# Patient Record
Sex: MALE | Race: WHITE | NOT HISPANIC OR LATINO | Employment: OTHER | URBAN - METROPOLITAN AREA
[De-identification: names, ages, dates, MRNs, and addresses within clinical notes are randomized per-mention and may not be internally consistent; named-entity substitution may affect disease eponyms.]

---

## 2021-06-09 ENCOUNTER — TRANSFERRED RECORDS (OUTPATIENT)
Dept: HEALTH INFORMATION MANAGEMENT | Facility: CLINIC | Age: 51
End: 2021-06-09

## 2021-06-09 ENCOUNTER — MEDICAL CORRESPONDENCE (OUTPATIENT)
Dept: HEALTH INFORMATION MANAGEMENT | Facility: CLINIC | Age: 51
End: 2021-06-09

## 2021-06-10 ENCOUNTER — MEDICAL CORRESPONDENCE (OUTPATIENT)
Dept: HEALTH INFORMATION MANAGEMENT | Facility: CLINIC | Age: 51
End: 2021-06-10

## 2021-06-11 ENCOUNTER — TRANSCRIBE ORDERS (OUTPATIENT)
Dept: OTHER | Age: 51
End: 2021-06-11

## 2021-06-11 DIAGNOSIS — J32.4 CHRONIC PANSINUSITIS: ICD-10-CM

## 2021-06-11 DIAGNOSIS — M31.31 GRANULOMATOSIS WITH POLYANGIITIS WITH RENAL INVOLVEMENT (H): Primary | ICD-10-CM

## 2021-06-15 NOTE — TELEPHONE ENCOUNTER
FUTURE VISIT INFORMATION      FUTURE VISIT INFORMATION:    Date: 7/14/21    Time: Noon    [place] [facility]  REFERRAL INFORMATION:    Referring provider:  Dr. Marlen Ortez    Referring providers clinic:  [facility]    Reason for visit/diagnosis: Pansinusitis, granulomatosis with polyangitis with renal involvement    RECORDS REQUESTED FROM:       Clinic name Comments Records Status Imaging Status   [facility] 6/9/21, 5/14/21 - ENT OV with Dr. Ortez Care Everywhere    [facility] - Imaging 4/28/21 - CT Sinus  6/20/18 - MRI Head Care Everywhere 6/15 Req - In PACs   [facility] 5/4/21 - RHEUM OV with Dr. Jett  3/21/21 - Admission with Dr. Obrien  7/7/20 - PULM OV with Amirah Lopez NP  5/28/20 - ENT OV with YASIR Davis Care Everywhere    [facility] - Imaging 3/22/21 - MRI Orbits  3/22/21 - MRI Brain  11/19/18 - CT IAC Temporal Bones WO Care Everywhere 6/15 Req - In PACs           * 6/15/21 8:41 AM Faxed req to [facility] and [facility] for images to be pushed to [facility]. - Gisselle  * 6/30/21 7:33 AM Faxed 2nd urg req to [facility] for images to be pushed to [facility]. - Gisselle

## 2021-07-14 ENCOUNTER — OFFICE VISIT (OUTPATIENT)
Dept: OTOLARYNGOLOGY | Facility: CLINIC | Age: 51
End: 2021-07-14
Attending: OTOLARYNGOLOGY
Payer: COMMERCIAL

## 2021-07-14 ENCOUNTER — TRANSFERRED RECORDS (OUTPATIENT)
Dept: OTOLARYNGOLOGY | Facility: CLINIC | Age: 51
End: 2021-07-14

## 2021-07-14 ENCOUNTER — PRE VISIT (OUTPATIENT)
Dept: OTOLARYNGOLOGY | Facility: CLINIC | Age: 51
End: 2021-07-14

## 2021-07-14 VITALS — WEIGHT: 190 LBS | BODY MASS INDEX: 25.18 KG/M2 | HEIGHT: 73 IN

## 2021-07-14 DIAGNOSIS — G50.1 ATYPICAL FACIAL PAIN: ICD-10-CM

## 2021-07-14 DIAGNOSIS — M31.31 GRANULOMATOSIS WITH POLYANGIITIS WITH RENAL INVOLVEMENT (H): ICD-10-CM

## 2021-07-14 DIAGNOSIS — J34.89 NASAL CRUSTING: ICD-10-CM

## 2021-07-14 DIAGNOSIS — J32.4 CHRONIC PANSINUSITIS: Primary | ICD-10-CM

## 2021-07-14 PROCEDURE — 31237 NSL/SINS NDSC SURG BX POLYPC: CPT | Mod: 50 | Performed by: OTOLARYNGOLOGY

## 2021-07-14 PROCEDURE — 99244 OFF/OP CNSLTJ NEW/EST MOD 40: CPT | Mod: 25 | Performed by: OTOLARYNGOLOGY

## 2021-07-14 RX ORDER — ACETAMINOPHEN 160 MG
TABLET,DISINTEGRATING ORAL
COMMUNITY
Start: 2021-04-22

## 2021-07-14 RX ORDER — FOLIC ACID 1 MG/1
TABLET ORAL
COMMUNITY
Start: 2021-06-29

## 2021-07-14 RX ORDER — CLARITHROMYCIN 500 MG
500 TABLET ORAL
COMMUNITY
Start: 2021-06-09 | End: 2021-07-21

## 2021-07-14 RX ORDER — CARVEDILOL 6.25 MG/1
TABLET ORAL
COMMUNITY
Start: 2021-07-03

## 2021-07-14 RX ORDER — CALCITRIOL 0.25 UG/1
0.25 CAPSULE, LIQUID FILLED ORAL
COMMUNITY
Start: 2021-04-22

## 2021-07-14 RX ORDER — AZATHIOPRINE 50 MG/1
50 TABLET ORAL
COMMUNITY
Start: 2021-01-12

## 2021-07-14 RX ORDER — FLUTICASONE PROPIONATE 50 MCG
2 SPRAY, SUSPENSION (ML) NASAL
COMMUNITY
Start: 2021-03-31 | End: 2022-03-31

## 2021-07-14 RX ORDER — PREDNISONE 10 MG/1
TABLET ORAL
COMMUNITY
Start: 2021-06-09

## 2021-07-14 RX ORDER — AZELASTINE 1 MG/ML
1-2 SPRAY, METERED NASAL
COMMUNITY
Start: 2021-01-12

## 2021-07-14 ASSESSMENT — MIFFLIN-ST. JEOR: SCORE: 1780.71

## 2021-07-14 ASSESSMENT — PAIN SCALES - GENERAL: PAINLEVEL: NO PAIN (0)

## 2021-07-14 NOTE — PROGRESS NOTES
Faxed CT order to patient's local clinic per provider request.    Requested order faxed to last facility patient had CT completed. Per patient chart last CT completed at [facility] Contacted facility for fax number to radiology and order faxed to [phone]. Patient was advised per provider to complete Biaxin before completing CT scan.    DEANNA GARY LPN on 7/14/2021 at 1:26 PM

## 2021-07-14 NOTE — PROGRESS NOTES
[facility]                   New Patient Visit      Encounter date: July 14, 2021    Referring Provider:   Marlen Ortez MD  [facility]  [address] [place] [address]    Chief Complaint: chronic sinusitis    History of Present Illness: Duglas Ricci is a 51 year-old gentleman with a history of GPA with prominent renal involvement, sinus, and moderate lung involvement. He states that he first noticed signs and symptoms 3 years ago with headaches, nasal crusting, and cough. His most bothersome symptomology today is chronic headache and nasal congestion. He is using budesonide rinses 2x daily and 6 weeks of Biaxin, with 1 week to completion. He is using 5mg Prednisone and Imuran--currently on hold secondary to active antibiotics.  Tried rituxan, but no improvement with this. No changes in external appearance of nose.      His current ENT Dr. Ortez referred him here for consultation for GPA-related CRS.   The patient is on an inactive transplant list tracking declining kidney functionality.     Sino-Nasal Outcome Test (SNOT - 22)   DNC    Review of systems: A 14-point review of systems has been conducted and was negative for any notable symptoms, except as dictated in the history of present illness.     Pmh:  GPA    PSH:  No prior sinonasal surgery    FH:  noncontributory    Social History     Socioeconomic History     Marital status: Single     Spouse name: Not on file     Number of children: Not on file     Years of education: Not on file     Highest education level: Not on file   Occupational History     Not on file   Tobacco Use     Smoking status: Not on file   Substance and Sexual Activity     Alcohol use: Not on file     Drug use: Not on file     Sexual activity: Not on file   Other Topics Concern     Not on file   Social History Narrative     Not on file     Social Determinants of Health     Financial Resource Strain:      Difficulty of Paying Living Expenses:   "  Food Insecurity:      Worried About Running Out of Food in the Last Year:      Ran Out of Food in the Last Year:    Transportation Needs:      Lack of Transportation (Medical):      Lack of Transportation (Non-Medical):    Physical Activity:      Days of Exercise per Week:      Minutes of Exercise per Session:    Stress:      Feeling of Stress :    Social Connections:      Frequency of Communication with Friends and Family:      Frequency of Social Gatherings with Friends and Family:      Attends Yarsanism Services:      Active Member of Clubs or Organizations:      Attends Club or Organization Meetings:      Marital Status:    Intimate Partner Violence:      Fear of Current or Ex-Partner:      Emotionally Abused:      Physically Abused:      Sexually Abused:         Physical Exam:  Vital signs: Ht 1.854 m (6' 1\")   Wt 86.2 kg (190 lb)   BMI 25.07 kg/m     General Appearance: No acute distress, appropriate demeanor, conversant  Eyes: moist conjunctivae; EOMI; pupils symmetric; visual acuity grossly intact; no proptosis  Head: normocephalic; overall symmetric appearance without deformity  Face: overall symmetric without deformity; HB I/VI  Ears: Normal appearance of external ear; external meatus normal in appearance; TMs intact without perforation bilaterally;   Nose: No external deformity;  See nasal endoscopy  Oral Cavity/oropharynx: Normal appearance of mucosa; tongue midline; no mass or lesions; oropharynx without obvious mucosal abnormality  Neck: no palpable lymphadenopathy; thyroid without palpable nodules  Lungs: symmetric chest rise; no wheezing  CV: Good distal perfusion; normal heart rate  Extremities: No deformity  Neurologic Exam: Cranial nerves II-XII are grossly intact; no focal deficit      Procedure Note  Procedure performed: Rigid nasal endoscopy with bilateral debridement  Indication: To evaluate for sinonasal pathology not visualized on routine anterior rhinoscopy  Anesthesia: 4% topical " lidocaine with 0.05% oxymetazoline  Description of procedure: A 30 degree, 3 mm rigid endoscope was inserted into bilateral nasal cavities and the nasal valve, nasal cavity, middle meatus, sphenoethmoid recess, and nasopharynx were thoroughly evaluated for evidence of obstruction, edema, purulence, polyps and/or mass/lesion.     I then used a combination of non-cutting forceps, straight and curved suction to clear bilateral sinonasal spaces of packing, clot, crust, and fibrinopurulent debris.     [place]-Ramakrishna Endoscopic Scoring System  Endoscopic observation Right Left   Polyps in middle meatus (0 = absent, 1 = restricted to middle meatus, 2 = Beyond middle meatus) 0 0   Discharge (0 = absent, 1 = thin and clear, 2 = thick, purulent) 1 1   Edema (0 = absent, 1 = mild-moderate, 2 = moderate-severe) 1 1   Crusting (0 = absent, 1 = mild-moderate, 2 = moderate-severe) 2 2   Scarring (0= absent, 1 = mild-moderate, 2 = moderate-severe) 1 1   Total 5 5     Findings  RT: Septal deviation. 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   LT: MMandSER with crusting and scarring.    The patient tolerated the procedure well without complication.     Laboratory Review:  n/a    Imaging Review:  Reviewed outside CT sinus from 4/28/2021 (prior to 6 week course of biaxin):  pansinus opacification    Pathology Review:  n/a    Assessment/Medical Decision Making/Plan:  GPA-related CRS  Nasal congestion  Atypical facial pain  Nasal crusting    Bilateral endo-debridement today  Discussed management of GPA-related CRS, associated challenges, and unique indications for surgery in GPA sinusitis. Specifically, we discussed that surgery would be indicated for persistent sinus disease and related symptoms despite appropriate medications, and complicated sinusitis (both of which he has had). We discussed that post-operative course can be challenged by poor wound healing and scarring in GPA    I discussed the risks, benefits, and  alternatives to endoscopic sinonasal surgery, including but not limited to: pain, bleeding, infection, CSF leak, orbital injury resulting in vision changes and/or vision loss, septal perforation and/or hematoma, dental hypesthesia, facial numbness, need for continued medical management after surgery, and need for additional procedures, among others. He was allowed to ask questions, which I answered to the best of my ability.     We also discussed an approach involving CT scan and antibiotic irrigation with trending symptom tolerance. Due to insurance reasons, patient will do the CT out patient at his own clinic; orders sent. Gentamicin irrigation initiated alongside current treatments (budesonide irrigation). After this discussion, he is amenable to surgery, but will wait at this time for outside results to be available and follow up virtually, after we've had a chance to review updated CT.       Hunter Gallego MD    [facility]  Center for Skull Base and Pituitary Surgery  [facility]  Department of Otolaryngology - Head & Neck Surgery    Justo Rodriguez, assisted with documentation. I have reviewed and revised as needed.

## 2021-07-14 NOTE — NURSING NOTE
"Chief Complaint   Patient presents with     Consult     Chronic Pansinusitis, grandulomatosis with polyangiitis with renal involvement       Height 1.854 m (6' 1\"), weight 86.2 kg (190 lb).    Dorina Gregg, EMT  "

## 2021-07-14 NOTE — PATIENT INSTRUCTIONS
1. You were seen in the clinic today by Dr. Gallego.    2.   The following has been recommended for you:   -Continue Budesonide rinses.   -Start Gentamicin rinses twice daily. Someone from [facility] pharmacy will reach out to you to obtain your insurance information and set up delivery.   -Complete your course of Biaxin. Once completed, please obtain CT scan locally based upon your last imaging. It appears your last CT scan was completed on 04/28/2021 at [facility] so we will fax the order over to them today. Please call them to schedule your CT. Once you have completed your CT scan, please give us a call so we can have them send over the images for Dr. Gallego to review and then he can follow up with you regarding ongoing treatment and care.    3.   Someone from the ENT clinic will call you to follow up with you regarding your results once your provider has had a chance to review them. We will discuss a plan of care and follow up with your provider at that time.    If you have any questions or concerns after your appointment, please call the clinic.    -Clinic phone [phone]. Press option #1 for scheduling related needs. Press option #3 for nurse advice.     June Martin, GWENDOLYNN  [phone]    Juliann Paniagua, RNCC  [phone]    [facility]  Department of Otolaryngology

## 2021-07-30 ENCOUNTER — TRANSFERRED RECORDS (OUTPATIENT)
Dept: HEALTH INFORMATION MANAGEMENT | Facility: CLINIC | Age: 51
End: 2021-07-30

## 2021-08-02 ENCOUNTER — TELEPHONE (OUTPATIENT)
Dept: OTOLARYNGOLOGY | Facility: CLINIC | Age: 51
End: 2021-08-02

## 2021-08-02 NOTE — TELEPHONE ENCOUNTER
Records Requested  08/02/21    Facility  [facility]  Phone: [phone]   Outcome * 8/2/21 10:28 AM Called out to [facility] for images to be pushed to [place]. They stated they'll push it ASAP. - Gisselle    * 8/2/21 10:46 AM Imaging received and attached to patient in PACs. - Gisselle    7/30/21 - CT Sinus WO

## 2021-08-06 ENCOUNTER — TELEPHONE (OUTPATIENT)
Dept: OTOLARYNGOLOGY | Facility: CLINIC | Age: 51
End: 2021-08-06

## 2021-08-06 NOTE — TELEPHONE ENCOUNTER
Called patient and spoke with him about scheduling a follow up video visit regarding the results of patient's CT scan. Expressed to patient that we have scheduled him for a video visit with Dr. Gallego on Wednesday, August 11th at 9:45 am.    Patient is unable to do video visit and is requesting a phone call from the provider. Verbalized with patient that he is not interested in viewing the images with the provider.    Spoke with provider about above patient request. Provider is in agreement with telephone visit.    Advised patient that someone from our clinic will call him about 15 minutes before appointment time Wednesday to get him checked in and then provider will be on shortly thereafter.    Patient advised to call us if he has any questions or concerns prior to appointment.    Patient verbalized understanding of all instructions and is in agreement with plan.    Patient visit mode switched from video visit to telephone visit per request of patient and agreement of provider.    DEANNA GARY LPN on 8/6/2021 at 2:43 PM

## 2021-08-11 ENCOUNTER — VIRTUAL VISIT (OUTPATIENT)
Dept: OTOLARYNGOLOGY | Facility: CLINIC | Age: 51
End: 2021-08-11
Payer: COMMERCIAL

## 2021-08-11 DIAGNOSIS — J32.4 CHRONIC PANSINUSITIS: ICD-10-CM

## 2021-08-11 DIAGNOSIS — R09.81 NASAL CONGESTION: Primary | ICD-10-CM

## 2021-08-11 DIAGNOSIS — M31.31 GRANULOMATOSIS WITH POLYANGIITIS WITH RENAL INVOLVEMENT (H): ICD-10-CM

## 2021-08-11 PROCEDURE — 99213 OFFICE O/P EST LOW 20 MIN: CPT | Mod: 95 | Performed by: OTOLARYNGOLOGY

## 2021-08-11 NOTE — PATIENT INSTRUCTIONS
1. You were seen in the clinic today by Dr. Gallego.    2.   The following has been recommended for you:   -Finish using the Gentamicin rinses and then resume the Budesonide rinses as per instruction of Dr. Gallego.   -Dr. Gallego did review surgery with you today and should you decide that you would like to proceed, please give us a call and we will get things moving on that. In the meantime, reach out to us should you have any symptom recurrence of sinus infections.    3.   Plan to return the clinic in 3 months for follow up.    If you have any questions or concerns after your appointment, please call the clinic.    -Clinic phone [phone]. Press option #1 for scheduling related needs. Press option #3 for nurse advice.     June Martin, CELESTINO  [phone]    Juliann Paniagua, RNCC  [phone]    [facility]  Department of Otolaryngology

## 2021-08-11 NOTE — LETTER
8/11/2021       RE: Duglas Ricci  [address]  [place] [facility] [address]     Dear Colleague,    Thank you for referring your patient, Duglas Ricci, to the [facility] [place] [facility]. Please see a copy of my visit note below.                     [facility]                     Return Patient Visit      Encounter date: August 11, 2021    Referring Provider:   Marlen Ortez MD  [facility]  [address] [place] [address]    Chief Complaint: chronic sinusitis    History of Present Illness/Initial Visit: Duglas Ricci is a 51 year-old gentleman with a history of GPA with prominent renal involvement, sinus, and moderate lung involvement. He states that he first noticed signs and symptoms 3 years ago with headaches, nasal crusting, and cough. His most bothersome symptomology today is chronic headache and nasal congestion. He is using budesonide rinses 2x daily and 6 weeks of Biaxin, with 1 week to completion. He is using 5mg Prednisone and Imuran--currently on hold secondary to active antibiotics.  Tried rituxan, but no improvement with this. No changes in external appearance of nose.      His current ENT Dr. Ortez referred him here for consultation for GPA-related CRS.   The patient is on an inactive transplant list tracking declining kidney functionality.     Interval visit:  Returns for telephone visit after obtaining updated CT, which we have reviewed below  Still c/o nasal congestion, facial pressure, somewhat improved with budesonide irrigations/gent rinses    Sino-Nasal Outcome Test (SNOT - 22)   DNC    Review of systems: A 14-point review of systems has been conducted and was negative for any notable symptoms, except as dictated in the history of present illness.     Pmh:  GPA    PSH:  No prior sinonasal surgery    FH:  noncontributory    Social History     Socioeconomic History     Marital  status: Single     Spouse name: Not on file     Number of children: Not on file     Years of education: Not on file     Highest education level: Not on file   Occupational History     Not on file   Tobacco Use     Smoking status: Not on file   Substance and Sexual Activity     Alcohol use: Not on file     Drug use: Not on file     Sexual activity: Not on file   Other Topics Concern     Not on file   Social History Narrative     Not on file     Social Determinants of Health     Financial Resource Strain:      Difficulty of Paying Living Expenses:    Food Insecurity:      Worried About Running Out of Food in the Last Year:      Ran Out of Food in the Last Year:    Transportation Needs:      Lack of Transportation (Medical):      Lack of Transportation (Non-Medical):    Physical Activity:      Days of Exercise per Week:      Minutes of Exercise per Session:    Stress:      Feeling of Stress :    Social Connections:      Frequency of Communication with Friends and Family:      Frequency of Social Gatherings with Friends and Family:      Attends Christian Services:      Active Member of Clubs or Organizations:      Attends Club or Organization Meetings:      Marital Status:    Intimate Partner Violence:      Fear of Current or Ex-Partner:      Emotionally Abused:      Physically Abused:      Sexually Abused:         Physical Exam (previous visit - 7/14/2021):  Vital signs: There were no vitals taken for this visit.   General Appearance: No acute distress, appropriate demeanor, conversant  Eyes: moist conjunctivae; EOMI; pupils symmetric; visual acuity grossly intact; no proptosis  Head: normocephalic; overall symmetric appearance without deformity  Face: overall symmetric without deformity; HB I/VI  Ears: Normal appearance of external ear; external meatus normal in appearance; TMs intact without perforation bilaterally;   Nose: No external deformity;  See nasal endoscopy  Oral Cavity/oropharynx: Normal appearance of  mucosa; tongue midline; no mass or lesions; oropharynx without obvious mucosal abnormality  Neck: no palpable lymphadenopathy; thyroid without palpable nodules  Lungs: symmetric chest rise; no wheezing  CV: Good distal perfusion; normal heart rate  Extremities: No deformity  Neurologic Exam: Cranial nerves II-XII are grossly intact; no focal deficit      Procedure Note (previous visit - 7/14/2021)  Procedure performed: Rigid nasal endoscopy with bilateral debridement  Indication: To evaluate for sinonasal pathology not visualized on routine anterior rhinoscopy  Anesthesia: 4% topical lidocaine with 0.05% oxymetazoline  Description of procedure: A 30 degree, 3 mm rigid endoscope was inserted into bilateral nasal cavities and the nasal valve, nasal cavity, middle meatus, sphenoethmoid recess, and nasopharynx were thoroughly evaluated for evidence of obstruction, edema, purulence, polyps and/or mass/lesion.     I then used a combination of non-cutting forceps, straight and curved suction to clear bilateral sinonasal spaces of packing, clot, crust, and fibrinopurulent debris.     [place]-Ramakrishna Endoscopic Scoring System  Endoscopic observation Right Left   Polyps in middle meatus (0 = absent, 1 = restricted to middle meatus, 2 = Beyond middle meatus) 0 0   Discharge (0 = absent, 1 = thin and clear, 2 = thick, purulent) 1 1   Edema (0 = absent, 1 = mild-moderate, 2 = moderate-severe) 1 1   Crusting (0 = absent, 1 = mild-moderate, 2 = moderate-severe) 2 2   Scarring (0= absent, 1 = mild-moderate, 2 = moderate-severe) 1 1   Total 5 5     Findings  RT: Septal deviation. 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   LT: MMandSER with crusting and scarring.    The patient tolerated the procedure well without complication.     Laboratory Review:  n/a    Imaging Review:  Reviewed outside CT sinus from 4/28/2021 (prior to 6 week course of biaxin):  pansinus opacification    Reviewed updated CT sinus from  7/30/2021:  Ongoing pansinus opacification with little change from previous CT scan 3 months earlier    Pathology Review:  n/a    Assessment/Medical Decision Making/Plan:  GPA-related CRS  Nasal congestion  Atypical facial pain  Nasal crusting    Discussed management of GPA-related CRS again today in the context of updated CT    We discussed risks of surgery as below, especially challenges unique to GPA, namely scarring. Primary indications for surgery would be to avoid complicated sinusitis and then relieve many of his ongoing symptoms related to his GPA-sinusitis.     I discussed the risks, benefits, and alternatives to endoscopic sinonasal surgery, including but not limited to: pain, bleeding, infection, CSF leak, orbital injury resulting in vision changes and/or vision loss, septal perforation and/or hematoma, dental hypesthesia, facial numbness, need for continued medical management after surgery, and need for additional procedures, among others. He was allowed to ask questions, which I answered to the best of my ability.    After this discussion, Duglas would like to think about his options and let me know how he wishes to proceed. He is leaning more towards observing his symptoms for now. If this is the case, I would like for him to follow up with me in 3 months.     Hunter Gallego MD    [facility]  Center for Skull Base and Pituitary Surgery  [facility]  Department of Otolaryngology - Head & Neck Surgery    Justo Rodriguez, assisted with documentation. I have reviewed and revised as needed.       Again, thank you for allowing me to participate in the care of your patient.      Sincerely,    Hunter Gallego MD

## 2021-08-11 NOTE — PROGRESS NOTES
Duglas is a 51 year old who is being evaluated via a billable telephone visit.      What phone number would you like to be contacted at? [phone]     Attending Attestation (For Attendings USE Only)...

## 2021-08-23 NOTE — PROGRESS NOTES
Telephone visit:  Start: 9:45am  Stop: 10:00am                   [facility]                   Return Patient Visit      Encounter date: August 11, 2021    Referring Provider:   Marlen Ortez MD  [facility]  [address] [place] [address]    Chief Complaint: chronic sinusitis    History of Present Illness/Initial Visit: Duglas Ricci is a 51 year-old gentleman with a history of GPA with prominent renal involvement, sinus, and moderate lung involvement. He states that he first noticed signs and symptoms 3 years ago with headaches, nasal crusting, and cough. His most bothersome symptomology today is chronic headache and nasal congestion. He is using budesonide rinses 2x daily and 6 weeks of Biaxin, with 1 week to completion. He is using 5mg Prednisone and Imuran--currently on hold secondary to active antibiotics.  Tried rituxan, but no improvement with this. No changes in external appearance of nose.      His current ENT Dr. Ortez referred him here for consultation for GPA-related CRS.   The patient is on an inactive transplant list tracking declining kidney functionality.     Interval visit:  Returns for telephone visit after obtaining updated CT, which we have reviewed below  Still c/o nasal congestion, facial pressure, somewhat improved with budesonide irrigations/gent rinses    Sino-Nasal Outcome Test (SNOT - 22)   DNC    Review of systems: A 14-point review of systems has been conducted and was negative for any notable symptoms, except as dictated in the history of present illness.     Pmh:  GPA    PSH:  No prior sinonasal surgery    FH:  noncontributory    Social History     Socioeconomic History     Marital status: Single     Spouse name: Not on file     Number of children: Not on file     Years of education: Not on file     Highest education level: Not on file   Occupational History     Not on file   Tobacco Use     Smoking status: Not on file   Substance and Sexual Activity      Alcohol use: Not on file     Drug use: Not on file     Sexual activity: Not on file   Other Topics Concern     Not on file   Social History Narrative     Not on file     Social Determinants of Health     Financial Resource Strain:      Difficulty of Paying Living Expenses:    Food Insecurity:      Worried About Running Out of Food in the Last Year:      Ran Out of Food in the Last Year:    Transportation Needs:      Lack of Transportation (Medical):      Lack of Transportation (Non-Medical):    Physical Activity:      Days of Exercise per Week:      Minutes of Exercise per Session:    Stress:      Feeling of Stress :    Social Connections:      Frequency of Communication with Friends and Family:      Frequency of Social Gatherings with Friends and Family:      Attends Anab[employer] Services:      Active Member of Clubs or Organizations:      Attends Club or Organization Meetings:      Marital Status:    Intimate Partner Violence:      Fear of Current or Ex-Partner:      Emotionally Abused:      Physically Abused:      Sexually Abused:         Physical Exam (previous visit - 7/14/2021):  Vital signs: There were no vitals taken for this visit.   General Appearance: No acute distress, appropriate demeanor, conversant  Eyes: moist conjunctivae; EOMI; pupils symmetric; visual acuity grossly intact; no proptosis  Head: normocephalic; overall symmetric appearance without deformity  Face: overall symmetric without deformity; HB I/VI  Ears: Normal appearance of external ear; external meatus normal in appearance; TMs intact without perforation bilaterally;   Nose: No external deformity;  See nasal endoscopy  Oral Cavity/oropharynx: Normal appearance of mucosa; tongue midline; no mass or lesions; oropharynx without obvious mucosal abnormality  Neck: no palpable lymphadenopathy; thyroid without palpable nodules  Lungs: symmetric chest rise; no wheezing  CV: Good distal perfusion; normal heart rate  Extremities: No  deformity  Neurologic Exam: Cranial nerves II-XII are grossly intact; no focal deficit      Procedure Note (previous visit - 7/14/2021)  Procedure performed: Rigid nasal endoscopy with bilateral debridement  Indication: To evaluate for sinonasal pathology not visualized on routine anterior rhinoscopy  Anesthesia: 4% topical lidocaine with 0.05% oxymetazoline  Description of procedure: A 30 degree, 3 mm rigid endoscope was inserted into bilateral nasal cavities and the nasal valve, nasal cavity, middle meatus, sphenoethmoid recess, and nasopharynx were thoroughly evaluated for evidence of obstruction, edema, purulence, polyps and/or mass/lesion.     I then used a combination of non-cutting forceps, straight and curved suction to clear bilateral sinonasal spaces of packing, clot, crust, and fibrinopurulent debris.     [place]-Ramakrishna Endoscopic Scoring System  Endoscopic observation Right Left   Polyps in middle meatus (0 = absent, 1 = restricted to middle meatus, 2 = Beyond middle meatus) 0 0   Discharge (0 = absent, 1 = thin and clear, 2 = thick, purulent) 1 1   Edema (0 = absent, 1 = mild-moderate, 2 = moderate-severe) 1 1   Crusting (0 = absent, 1 = mild-moderate, 2 = moderate-severe) 2 2   Scarring (0= absent, 1 = mild-moderate, 2 = moderate-severe) 1 1   Total 5 5     Findings  RT: Septal deviation. 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   LT: MMandSER with crusting and scarring.    The patient tolerated the procedure well without complication.     Laboratory Review:  n/a    Imaging Review:  Reviewed outside CT sinus from 4/28/2021 (prior to 6 week course of biaxin):  pansinus opacification    Reviewed updated CT sinus from 7/30/2021:  Ongoing pansinus opacification with little change from previous CT scan 3 months earlier    Pathology Review:  n/a    Assessment/Medical Decision Making/Plan:  GPA-related CRS  Nasal congestion  Atypical facial pain  Nasal crusting    Discussed management of GPA-related  CRS again today in the context of updated CT    We discussed risks of surgery as below, especially challenges unique to GPA, namely scarring. Primary indications for surgery would be to avoid complicated sinusitis and then relieve many of his ongoing symptoms related to his GPA-sinusitis.     I discussed the risks, benefits, and alternatives to endoscopic sinonasal surgery, including but not limited to: pain, bleeding, infection, CSF leak, orbital injury resulting in vision changes and/or vision loss, septal perforation and/or hematoma, dental hypesthesia, facial numbness, need for continued medical management after surgery, and need for additional procedures, among others. He was allowed to ask questions, which I answered to the best of my ability.    After this discussion, Duglas would like to think about his options and let me know how he wishes to proceed. He is leaning more towards observing his symptoms for now. If this is the case, I would like for him to follow up with me in 3 months.     Hunter Gallego MD    [facility]  [facility] for Skull Base and Pituitary Surgery  [facility]  Department of Otolaryngology - Head & Neck Surgery    Justo Rodriguez, assisted with documentation. I have reviewed and revised as needed.

## 2021-11-12 ENCOUNTER — OFFICE VISIT (OUTPATIENT)
Dept: OTOLARYNGOLOGY | Facility: CLINIC | Age: 51
End: 2021-11-12
Payer: COMMERCIAL

## 2021-11-12 VITALS
WEIGHT: 196 LBS | TEMPERATURE: 98.7 F | OXYGEN SATURATION: 98 % | BODY MASS INDEX: 25.98 KG/M2 | HEIGHT: 73 IN | HEART RATE: 62 BPM

## 2021-11-12 DIAGNOSIS — M31.31 GRANULOMATOSIS WITH POLYANGIITIS WITH RENAL INVOLVEMENT (H): ICD-10-CM

## 2021-11-12 DIAGNOSIS — J32.4 CHRONIC PANSINUSITIS: Primary | ICD-10-CM

## 2021-11-12 DIAGNOSIS — R09.81 NASAL CONGESTION: ICD-10-CM

## 2021-11-12 PROCEDURE — 99213 OFFICE O/P EST LOW 20 MIN: CPT | Mod: 25 | Performed by: OTOLARYNGOLOGY

## 2021-11-12 PROCEDURE — 31237 NSL/SINS NDSC SURG BX POLYPC: CPT | Mod: 50 | Performed by: OTOLARYNGOLOGY

## 2021-11-12 RX ORDER — BUDESONIDE 0.5 MG/2ML
INHALANT ORAL
COMMUNITY
Start: 2021-09-27

## 2021-11-12 ASSESSMENT — MIFFLIN-ST. JEOR: SCORE: 1807.93

## 2021-11-12 ASSESSMENT — PAIN SCALES - GENERAL: PAINLEVEL: NO PAIN (0)

## 2021-11-12 NOTE — NURSING NOTE
"Chief Complaint   Patient presents with     RECHECK     3 month follow up      Pulse 62, temperature 98.7  F (37.1  C), height 1.854 m (6' 1\"), weight 88.9 kg (196 lb), SpO2 98 %.    Renzo Oviedo LPN    "

## 2021-11-12 NOTE — PATIENT INSTRUCTIONS
"1. You were seen in the clinic today by Dr. Gallego. If you have any questions or concerns after your appointment, please call the clinic at [phone]. Press \"1\" for scheduling, press \"3\" for nurse advice.    2.   The following has been recommended for you based upon your appointment today:   -Continue twice daily sinus rinses     3.   Plan to return the clinic in 6 months       Juliann Paniagua RN   [facility]  Department of Otolaryngology  [phone]    "

## 2021-11-12 NOTE — PROGRESS NOTES
"  [facility]  Return Visit  Encounter date:   November 12, 2021    Chief Complaint: chronic sinusitis     Interval History:   Duglas Ricci is a 51 year old male, with a history of GPA with prominent renal involvement, sinus, and moderate lung involvement who presents for follow up. The patient last presented to me in 3 months ago (08/11/2021) for 3 years of headaches, nasal crusting, and cough. The patient has used budesonide rinses 2x daily and had one week left of a 6-week course of Biaxin. He held 5 mg of Prednisone and Imuran secondary to antibiotics. His current ENT Dr. Ortez referred him here for consultation for GPA-related CRS. The patient is on an inactive transplant list tracking declining kidney functionality. We discussed surgical intervention at our last  Visit, and the patient wanted time to think about this.     Today, the patient reports drainage out of the left side of the nose. He reports improved facial pressure on the left side. The patient is taking Imuran and Prednisone as directed.     [place] [facility]  No history of sinonasal surgery    Review of systems: A 14-point review of systems has been conducted and is negative for any notable symptoms, except as dictated in the history of present illness.     Physical Exam:  Vital signs: Pulse 62   Temp 98.7  F (37.1  C)   Ht 1.854 m (6' 1\")   Wt 88.9 kg (196 lb)   SpO2 98%   BMI 25.86 kg/m     General Appearance: No acute distress, appropriate demeanor, conversant  Eyes: moist conjunctivae; EOMI; pupils symmetric; visual acuity grossly intact; no proptosis  Head: normocephalic; overall symmetric appearance without deformity  Face: overall symmetric without deformity; HB I/VI  Nose: No external deformity   Neck: no palpable lymphadenopathy; thyroid without palpable nodules  Lungs: symmetric chest rise; no wheezing  CV: Good distal perfusion; normal hear rate  Extremities: No deformity  Neurologic Exam: Cranial nerves " II-XII are grossly intact; no focal deficit    Procedure Note  Procedure performed: Rigid nasal endoscopy with bilateral debridement  Indication: To evaluate for sinonasal pathology not visualized on routine anterior rhinoscopy  Anesthesia: 4% topical lidocaine with 0.05 % oxymetazoline  Description of procedure: A 30 degree, 3 mm rigid endoscope was inserted into bilateral nasal cavities and the nasal valves, nasal cavity, middle meatus, sphenoethmoid recess, nasopharynx were evaluated for evidence of obstruction, edema, purulence, polyps and/or mass/lesion.     I then used a combination of non-cutting forceps, straight and curved suction to clear bilateral surgical cavities of packing, clot, crust, and fibrinopurulent debris.     [place]-Ramakrishna Endoscopic Scoring System  Endoscopic observation Right Left   Polyps in middle meatus (0 = absent, 1 = restricted to middle meatus, 2 = Beyond middle meatus) 0 0   Discharge (0 = absent, 1 = thin and clear, 2 = thick, purulent) 0 0   Edema (0 = absent, 1 = mild-moderate, 2 = moderate-severe) 1 0   Crusting (0 = absent, 1 = mild-moderate, 2 = moderate-severe) 1 2   Scarring (0= absent, 1 = mild-moderate, 2 = moderate-severe) 2 1   Total 4 3     Findings  RT: MM and SER with scarring and crusting.   No purulence to suggest active infection  LT: MM and SER with scarring and crusting.  No purulence to suggest active infection      The patient tolerated the procedure well without complication.     Laboratory Review:  n/a    Imaging Review:  CT SINUSES wo CONTRAST:  (07/30/2021)  IMPRESSION:   1.  Overall unchanged extensive opacification of the frontal sinuses, ethmoid air cells, and left sphenoid sinus. There is slightly worsened opacification of the maxillary sinuses, now nearly completely opacified. Slight improvement in previously present frothy secretions in the right sphenoid sinus.   2.  Persistent occlusion of the bilateral frontoethmoidal recesses   and the left sphenoid  ostium. Unchanged narrowing of the right   sphenoid ostium. Slightly improved patency of the postsurgical left maxillary sinus drainage pathway. The right postsurgical maxillary sinus drainage pathway remains patent.    *I have personally reviewed these images and agree with the radiologist's impression*    Pathology Review:  n/a    Assessment/Medical Decision Making:  GPA-related CRS  Nasal congestion  Atypical facial pain  Nasal crusting    Nasal endoscopy with debridement today    Stable sinonasal disease without evidence of worsening GPA-related sinusitis  No external nasal deformity or septal perforation    I think reasonable to monitor for now; would not necessarily move forward with surgery at this time, as I think it would add little benefit    Plan:  Continue budesonide rinses 2-3x per day  RTC 6 months, or sooner prn    Hunter Gallego MD    [facility]  Rhinology, Endoscopic Skull Base Surgery  [facility]  Department of Otolaryngology - Head & Neck Surgery    Scribe Disclosure:  I, June Aung, am serving as a scribe to document services personally performed by Hunter Gallego MD at this visit, based upon the provider's statements to me. All documentation has been reviewed by the aforementioned provider prior to being entered into the official medical record.     Additional portions of the patient's history have been reviewed below.   ~~~~~~~~~~~~~~~~~~~~~~~~~~~~~~~~~~~~~~~~~~~~~~~~~~~~~~~~~~~~~~~~~~~~~~~~~~~~~~~~~~~~~~~~~~~~~~~~~~~~~~~~~~~~~~~~~~~~~~~~~~~~~~~~~~~~~~~    No past medical history on file.     No past surgical history on file.     No family history on file.     Social History     Socioeconomic History     Marital status: Single     Spouse name: None     Number of children: None     Years of education: None     Highest education level: None   Occupational History     None   Tobacco Use     Smoking status: Never Smoker     Smokeless tobacco:  Never Used   Substance and Sexual Activity     Alcohol use: Not Currently     Drug use: None     Sexual activity: None   Other Topics Concern     None   Social History Narrative     None     Social Determinants of Health     Financial Resource Strain: Not on file   Food Insecurity: Not on file   Transportation Needs: Not on file   Physical Activity: Not on file   Stress: Not on file   Social Connections: Not on file   Intimate Partner Violence: Not on file   Housing Stability: Not on file

## 2021-11-12 NOTE — LETTER
"11/12/2021       RE: Duglas Ricci  [address]  [place] [address]     Dear Colleague,    Thank you for referring your patient, Duglas Ricci, to the [facility] [place] [facility]. Please see a copy of my visit note below.      [facility]  Return Visit  Encounter date:   November 12, 2021    Chief Complaint: chronic sinusitis     Interval History:   Duglas Ricci is a 51 year old male, with a history of GPA with prominent renal involvement, sinus, and moderate lung involvement who presents for follow up. The patient last presented to me in 3 months ago (08/11/2021) for 3 years of headaches, nasal crusting, and cough. The patient has used budesonide rinses 2x daily and had one week left of a 6-week course of Biaxin. He held 5 mg of Prednisone and Imuran secondary to antibiotics. His current ENT Dr. Ortez referred him here for consultation for GPA-related CRS. The patient is on an inactive transplant list tracking declining kidney functionality. We discussed surgical intervention at our last  Visit, and the patient wanted time to think about this.     Today, the patient reports drainage out of the left side of the nose. He reports improved facial pressure on the left side. The patient is taking Imuran and Prednisone as directed.     [place] Oper[facility]  No history of sinonasal surgery    Review of systems: A 14-point review of systems has been conducted and is negative for any notable symptoms, except as dictated in the history of present illness.     Physical Exam:  Vital signs: Pulse 62   Temp 98.7  F (37.1  C)   Ht 1.854 m (6' 1\")   Wt 88.9 kg (196 lb)   SpO2 98%   BMI 25.86 kg/m     General Appearance: No acute distress, appropriate demeanor, conversant  Eyes: moist conjunctivae; EOMI; pupils symmetric; visual acuity grossly intact; no proptosis  Head: normocephalic; overall symmetric " appearance without deformity  Face: overall symmetric without deformity; HB I/VI  Nose: No external deformity   Neck: no palpable lymphadenopathy; thyroid without palpable nodules  Lungs: symmetric chest rise; no wheezing  CV: Good distal perfusion; normal hear rate  Extremities: No deformity  Neurologic Exam: Cranial nerves II-XII are grossly intact; no focal deficit    Procedure Note  Procedure performed: Rigid nasal endoscopy with bilateral debridement  Indication: To evaluate for sinonasal pathology not visualized on routine anterior rhinoscopy  Anesthesia: 4% topical lidocaine with 0.05 % oxymetazoline  Description of procedure: A 30 degree, 3 mm rigid endoscope was inserted into bilateral nasal cavities and the nasal valves, nasal cavity, middle meatus, sphenoethmoid recess, nasopharynx were evaluated for evidence of obstruction, edema, purulence, polyps and/or mass/lesion.     I then used a combination of non-cutting forceps, straight and curved suction to clear bilateral surgical cavities of packing, clot, crust, and fibrinopurulent debris.     Julia-Ramakrishna Endoscopic Scoring System  Endoscopic observation Right Left   Polyps in middle meatus (0 = absent, 1 = restricted to middle meatus, 2 = Beyond middle meatus) 0 0   Discharge (0 = absent, 1 = thin and clear, 2 = thick, purulent) 0 0   Edema (0 = absent, 1 = mild-moderate, 2 = moderate-severe) 1 0   Crusting (0 = absent, 1 = mild-moderate, 2 = moderate-severe) 1 2   Scarring (0= absent, 1 = mild-moderate, 2 = moderate-severe) 2 1   Total 4 3     Findings  RT: MM and SER with scarring and crusting.   No purulence to suggest active infection  LT: MM and SER with scarring and crusting.  No purulence to suggest active infection      The patient tolerated the procedure well without complication.     Laboratory Review:  n/a    Imaging Review:  CT SINUSES wo CONTRAST:  (07/30/2021)  IMPRESSION:   1.  Overall unchanged extensive opacification of the frontal  sinuses, ethmoid air cells, and left sphenoid sinus. There is slightly worsened opacification of the maxillary sinuses, now nearly completely opacified. Slight improvement in previously present frothy secretions in the right sphenoid sinus.   2.  Persistent occlusion of the bilateral frontoethmoidal recesses   and the left sphenoid ostium. Unchanged narrowing of the right   sphenoid ostium. Slightly improved patency of the postsurgical left maxillary sinus drainage pathway. The right postsurgical maxillary sinus drainage pathway remains patent.    *I have personally reviewed these images and agree with the radiologist's impression*    Pathology Review:  n/a    Assessment/Medical Decision Making:  GPA-related CRS  Nasal congestion  Atypical facial pain  Nasal crusting    Nasal endoscopy with debridement today    Stable sinonasal disease without evidence of worsening GPA-related sinusitis  No external nasal deformity or septal perforation    I think reasonable to monitor for now; would not necessarily move forward with surgery at this time, as I think it would add little benefit    Plan:  Continue budesonide rinses 2-3x per day  RTC 6 months, or sooner prn    Hunter Gallego MD    [facility]  Rhinology, Endoscopic Skull Base Surgery  [facility]  Department of Otolaryngology - Head & Neck Surgery    Scribe Disclosure:  I, June Horan, am serving as a scribe to document services personally performed by Hunter Gallego MD at this visit, based upon the provider's statements to me. All documentation has been reviewed by the aforementioned provider prior to being entered into the official medical record.     Additional portions of the patient's history have been reviewed below.   ~~~~~~~~~~~~~~~~~~~~~~~~~~~~~~~~~~~~~~~~~~~~~~~~~~~~~~~~~~~~~~~~~~~~~~~~~~~~~~~~~~~~~~~~~~~~~~~~~~~~~~~~~~~~~~~~~~~~~~~~~~~~~~~~~~~~~~~    No past medical history on file.     No past surgical  history on file.     No family history on file.     Social History     Socioeconomic History     Marital status: Single     Spouse name: None     Number of children: None     Years of education: None     Highest education level: None   Occupational History     None   Tobacco Use     Smoking status: Never Smoker     Smokeless tobacco: Never Used   Substance and Sexual Activity     Alcohol use: Not Currently     Drug use: None     Sexual activity: None   Other Topics Concern     None   Social History Narrative     None     Social Determinants of Health     Financial Resource Strain: Not on file   Food Insecurity: Not on file   Transportation Needs: Not on file   Physical Activity: Not on file   Stress: Not on file   Social Connections: Not on file   Intimate Partner Violence: Not on file   Housing Stability: Not on file        Again, thank you for allowing me to participate in the care of your patient.      Sincerely,    Hunter Gallego MD

## 2022-03-25 ENCOUNTER — TELEPHONE (OUTPATIENT)
Dept: OTOLARYNGOLOGY | Facility: CLINIC | Age: 52
End: 2022-03-25
Payer: COMMERCIAL

## 2022-07-22 ENCOUNTER — OFFICE VISIT (OUTPATIENT)
Dept: OTOLARYNGOLOGY | Facility: CLINIC | Age: 52
End: 2022-07-22
Payer: COMMERCIAL

## 2022-07-22 VITALS
OXYGEN SATURATION: 98 % | DIASTOLIC BLOOD PRESSURE: 94 MMHG | HEIGHT: 74 IN | BODY MASS INDEX: 25.28 KG/M2 | SYSTOLIC BLOOD PRESSURE: 135 MMHG | HEART RATE: 64 BPM | WEIGHT: 197 LBS | TEMPERATURE: 97.4 F

## 2022-07-22 DIAGNOSIS — J38.6 SUBGLOTTIC STENOSIS: ICD-10-CM

## 2022-07-22 DIAGNOSIS — J34.89 NASAL CRUSTING: ICD-10-CM

## 2022-07-22 DIAGNOSIS — M31.31 GRANULOMATOSIS WITH POLYANGIITIS WITH RENAL INVOLVEMENT (H): ICD-10-CM

## 2022-07-22 DIAGNOSIS — J32.4 CHRONIC PANSINUSITIS: Primary | ICD-10-CM

## 2022-07-22 PROCEDURE — 99213 OFFICE O/P EST LOW 20 MIN: CPT | Mod: 25 | Performed by: OTOLARYNGOLOGY

## 2022-07-22 PROCEDURE — 31575 DIAGNOSTIC LARYNGOSCOPY: CPT | Mod: 51 | Performed by: OTOLARYNGOLOGY

## 2022-07-22 PROCEDURE — 31231 NASAL ENDOSCOPY DX: CPT | Performed by: OTOLARYNGOLOGY

## 2022-07-22 RX ORDER — PREDNISONE 5 MG/1
5 TABLET ORAL DAILY
COMMUNITY
Start: 2021-11-01

## 2022-07-22 ASSESSMENT — PAIN SCALES - GENERAL: PAINLEVEL: NO PAIN (0)

## 2022-07-22 NOTE — PATIENT INSTRUCTIONS
"You were seen in the clinic today by Dr. Gallego. If you have any questions or concerns after your appointment, please call the clinic at [phone]. Press \"1\" for scheduling, press \"3\" for nurse advice.    2.   The following has been recommended for you based upon your appointment today:   -A referral has been sent to our laryngology team to see them for your Wegener's.    3.   Plan to return the clinic in 6 months.       June Martin LPN  [facility]  Department of Otolaryngology  [phone]   "

## 2022-07-22 NOTE — LETTER
7/22/2022       RE: Duglas Ricci  [address]  [place] [address]     Dear Colleague,    Thank you for referring your patient, Duglas Ricci, to the [facility] EAR NOSE AND THROAT CLINIC [place] at [facility]. Please see a copy of my visit note below.      [facility]  Return Visit  Encounter date:   July 22, 2022    Chief Complaint:   Follow-up     ID: chronic sinusitis    Interval History:   Duglas Ricci is a 52 year old male with a history of GPA with prominent renal involvement, sinus, and moderate lung involvement who presents for follow up. At our last visit (11/12/21) his sinonasal disease appeared stable with no evidence of worsening GPA-related sinusitis. He elected for surveillance and was advised to continue with budesonide rinses. He tells me he still has some crusting on the left side but not as severe as before and his nose is overall doing well. He has continued using budesonide rinses but doesn't notice any significant help from this. He has significant crusts produced every couple weeks out of his left nostril with the rinses. His sense of smell is slightly coming back. He reports no external changes to his nose. He has increased tearing of his eyes bilaterally but only notices occassionally or while watching TV. He denies any facial pressure or facial pain. His kidneys have been relatively stable and he is not currently on the active transplant list. He has continued 5 mg of Prednisone and Imuran with no infusions. He has noticed a more hoarse voice and shortness of breath within a year following onset of his GPA.     His rheumatologist was somewhat concerned given the recent and change in voice and some dyspnea as well.  He asked to have have laryngoscopy be performed at this visit.    Sino-Nasal Outcome Test (SNOT - 22)  [facility] Operative History  No history of sinonasal surgery    Review of systems: A  "14-point review of systems has been conducted and is negative for any notable symptoms, except as dictated in the history of present illness.     Physical Exam:  Vital signs: BP (!) 135/94 (BP Location: Left arm, Patient Position: Sitting, Cuff Size: Adult Regular)   Pulse 64   Temp 97.4  F (36.3  C) (Temporal)   Ht 1.867 m (6' 1.5\")   Wt 89.4 kg (197 lb)   SpO2 98%   BMI 25.64 kg/m     General Appearance: No acute distress, appropriate demeanor, conversant  Eyes: moist conjunctivae; EOMI; pupils symmetric; visual acuity grossly intact; no proptosis  Head: normocephalic; overall symmetric appearance without deformity  Face: overall symmetric without deformity; HB I/VI  Nose: No external nasal deformity; see endoscopy  Lungs: symmetric chest rise; no wheezing  CV: Good distal perfusion; normal hear rate  Extremities: No deformity  Neurologic Exam: Cranial nerves II-XII are grossly intact; no focal deficit    Procedure Note  Procedure performed: Flexible laryngoscopy   Indication: GPA with shortness of breath  Description: flexible laryngoscope through left nasal cavity into oropharynx  Oropharynx, larynx, and hypolarynx are examined.     There is symmetric bilateral fold movement.     There is circumferential narrowing of the subglottic larynx    Procedure performed: Rigid nasal endoscopy  Indication: To evaluate for sinonasal pathology not visualized on routine anterior rhinoscopy  Anesthesia: 4% topical lidocaine with 0.05 % oxymetazoline  Description of procedure: A 30 degree, 3 mm rigid endoscope was inserted into bilateral nasal cavities and the nasal valves, nasal cavity, middle meatus, sphenoethmoid recess, nasopharynx were evaluated for evidence of obstruction, edema, purulence, polyps and/or mass/lesion.     I then used a combination of non-cutting forceps, straight and curved suction to clear the bilateral sinonasal cavities of exuberant crust formation.    [place]-Ramakrishna Endoscopic Scoring " System  Endoscopic observation Right Left   Polyps in middle meatus (0 = absent, 1 = restricted to middle meatus, 2 = Beyond middle meatus) 0 0   Discharge (0 = absent, 1 = thin and clear, 2 = thick, purulent) 0 2   Edema (0 = absent, 1 = mild-moderate, 2 = moderate-severe) 0 0   Crusting (0 = absent, 1 = mild-moderate, 2 = moderate-severe) 1 2   Scarring (0= absent, 1 = mild-moderate, 2 = moderate-severe) 1 2   Total 2 6     Findings  RT: Mild crust of high middle meatus otherwise ; , MM and SER are clear  LT: Scarring and crusting of MM; there is some mucopurulence from the middle meatus     no septal perforation    The patient tolerated the procedure well without complication.     Laboratory Review:  N/A      Imaging Review:  CT SINUSES wo CONTRAST (07/30/2021):   1.  Overall unchanged extensive opacification of the frontal sinuses, ethmoid air cells, and left sphenoid sinus. There is slightly worsened opacification of the maxillary sinuses, now nearly completely opacified. Slight improvement in previously present frothy secretions in the right sphenoid sinus.   2.  Persistent occlusion of the bilateral frontoethmoidal recesses   and the left sphenoid ostium. Unchanged narrowing of the right   sphenoid ostium. Slightly improved patency of the postsurgical left maxillary sinus drainage pathway. The right postsurgical maxillary sinus drainage pathway remains patent.    Pathology Review:  N/A      Assessment/Medical Decision Making:  GPA-related CRS  Nasal congestion  Atypical facial pain  Nasal crusting     Bilateral endoscopy today showed crusting of the left MM which was cleared but otherwise stable compared to previous.     Laryngoscopy was performed and indicated given voice changes, cough and worsening shortness of breath.  This demonstrated some circumferential scarring of the subglottic larynx.  We will make more urgent referral to laryngology for evaluation.  We did discuss warning signs including worsening  shortness of breath in the coming weeks which may warrant sooner follow-up.    Plan:  Flex laryngoscope revealed circumferential narrowing and scarring - will provide him a referral for laryngology and will discuss case with them  RTC in 1 year for observation of sinonasal disease -I did let him know about warning sinonasal signs for which she should reach out to us sooner      Hunter Gallego MD    [facility]  Rhinology, Endoscopic Skull Base Surgery  [facility]  Department of Otolaryngology - Head & Neck Surgery    Scribe Disclosure:  I, Dottie Jadyn, am serving as a scribe to document services personally performed by Hunter Gallego MD at this visit, based upon the provider's statements to me. All documentation has been reviewed by the aforementioned provider prior to being entered into the official medical record.     Additional portions of the patient's history have been reviewed below.   ~~~~~~~~~~~~~~~~~~~~~~~~~~~~~~~~~~~~~~~~~~~~~~~~~~~~~~~~~~~~~~~~~~~~~~~~~~~~~~~~~~~~~~~~~~~~~~~~~~~~~~~~~~~~~~~~~~~~~~~~~~~~~~~~~~~~~~~    No past medical history on file.     No past surgical history on file.     No family history on file.     Social History     Socioeconomic History     Marital status: Single     Spouse name: None     Number of children: None     Years of education: None     Highest education level: None   Tobacco Use     Smoking status: Never Smoker     Smokeless tobacco: Never Used   Substance and Sexual Activity     Alcohol use: Not Currently            Again, thank you for allowing me to participate in the care of your patient.      Sincerely,    Hunter Gallego MD

## 2022-07-22 NOTE — NURSING NOTE
"Blood pressure (!) 135/94, pulse 64, temperature 97.4  F (36.3  C), temperature source Temporal, height 1.867 m (6' 1.5\"), weight 89.4 kg (197 lb), SpO2 98 %.    Treasure Tejada LPN    "

## 2022-07-22 NOTE — PROGRESS NOTES
[facility]  Return Visit  Encounter date:   July 22, 2022    Chief Complaint:   Follow-up     ID: chronic sinusitis    Interval History:   Duglas Ricci is a 52 year old male with a history of GPA with prominent renal involvement, sinus, and moderate lung involvement who presents for follow up. At our last visit (11/12/21) his sinonasal disease appeared stable with no evidence of worsening GPA-related sinusitis. He elected for surveillance and was advised to continue with budesonide rinses. He tells me he still has some crusting on the left side but not as severe as before and his nose is overall doing well. He has continued using budesonide rinses but doesn't notice any significant help from this. He has significant crusts produced every couple weeks out of his left nostril with the rinses. His sense of smell is slightly coming back. He reports no external changes to his nose. He has increased tearing of his eyes bilaterally but only notices occassionally or while watching TV. He denies any facial pressure or facial pain. His kidneys have been relatively stable and he is not currently on the active transplant list. He has continued 5 mg of Prednisone and Imuran with no infusions. He has noticed a more hoarse voice and shortness of breath within a year following onset of his GPA.     His rheumatologist was somewhat concerned given the recent and change in voice and some dyspnea as well.  He asked to have have laryngoscopy be performed at this visit.    Sino-Nasal Outcome Test (SNOT - 22)  [facility]    [place] Operative [facility]  No history of sinonasal surgery    Review of systems: A 14-point review of systems has been conducted and is negative for any notable symptoms, except as dictated in the history of present illness.     Physical Exam:  Vital signs: BP (!) 135/94 (BP Location: Left arm, Patient Position: Sitting, Cuff Size: Adult Regular)   Pulse 64   Temp 97.4  F (36.3  C) (Temporal)   Ht  "1.867 m (6' 1.5\")   Wt 89.4 kg (197 lb)   SpO2 98%   BMI 25.64 kg/m     General Appearance: No acute distress, appropriate demeanor, conversant  Eyes: moist conjunctivae; EOMI; pupils symmetric; visual acuity grossly intact; no proptosis  Head: normocephalic; overall symmetric appearance without deformity  Face: overall symmetric without deformity; HB I/VI  Nose: No external nasal deformity; see endoscopy  Lungs: symmetric chest rise; no wheezing  CV: Good distal perfusion; normal hear rate  Extremities: No deformity  Neurologic Exam: Cranial nerves II-XII are grossly intact; no focal deficit    Procedure Note  Procedure performed: Flexible laryngoscopy   Indication: GPA with shortness of breath  Description: flexible laryngoscope through left nasal cavity into oropharynx  Oropharynx, larynx, and hypolarynx are examined.     There is symmetric bilateral fold movement.     There is circumferential narrowing of the subglottic larynx    Procedure performed: Rigid nasal endoscopy  Indication: To evaluate for sinonasal pathology not visualized on routine anterior rhinoscopy  Anesthesia: 4% topical lidocaine with 0.05 % oxymetazoline  Description of procedure: A 30 degree, 3 mm rigid endoscope was inserted into bilateral nasal cavities and the nasal valves, nasal cavity, middle meatus, sphenoethmoid recess, nasopharynx were evaluated for evidence of obstruction, edema, purulence, polyps and/or mass/lesion.     I then used a combination of non-cutting forceps, straight and curved suction to clear the bilateral sinonasal cavities of exuberant crust formation.    [place]ia-Ramakrishna Endoscopic Scoring System  Endoscopic observation Right Left   Polyps in middle meatus (0 = absent, 1 = restricted to middle meatus, 2 = Beyond middle meatus) 0 0   Discharge (0 = absent, 1 = thin and clear, 2 = thick, purulent) 0 2   Edema (0 = absent, 1 = mild-moderate, 2 = moderate-severe) 0 0   Crusting (0 = absent, 1 = mild-moderate, 2 = " moderate-severe) 1 2   Scarring (0= absent, 1 = mild-moderate, 2 = moderate-severe) 1 2   Total 2 6     Findings  RT: Mild crust of high middle meatus otherwise ; , MM and SER are clear  LT: Scarring and crusting of MM; there is some mucopurulence from the middle meatus     no septal perforation    The patient tolerated the procedure well without complication.     Laboratory Review:  N/A      Imaging Review:  CT SINUSES wo CONTRAST (07/30/2021):   1.  Overall unchanged extensive opacification of the frontal sinuses, ethmoid air cells, and left sphenoid sinus. There is slightly worsened opacification of the maxillary sinuses, now nearly completely opacified. Slight improvement in previously present frothy secretions in the right sphenoid sinus.   2.  Persistent occlusion of the bilateral frontoethmoidal recesses   and the left sphenoid ostium. Unchanged narrowing of the right   sphenoid ostium. Slightly improved patency of the postsurgical left maxillary sinus drainage pathway. The right postsurgical maxillary sinus drainage pathway remains patent.    Pathology Review:  N/A      Assessment/Medical Decision Making:  GPA-related CRS  Nasal congestion  Atypical facial pain  Nasal crusting     Bilateral endoscopy today showed crusting of the left MM which was cleared but otherwise stable compared to previous.     Laryngoscopy was performed and indicated given voice changes, cough and worsening shortness of breath.  This demonstrated some circumferential scarring of the subglottic larynx.  We will make more urgent referral to laryngology for evaluation.  We did discuss warning signs including worsening shortness of breath in the coming weeks which may warrant sooner follow-up.    Plan:  Flex laryngoscope revealed circumferential narrowing and scarring - will provide him a referral for laryngology and will discuss case with them  RTC in 1 year for observation of sinonasal disease -I did let him know about warning sinonasal  signs for which she should reach out to us sooner      Hunter Gallego MD    [facility]  Rhinology, Endoscopic Skull Base Surgery  [facility]  Department of Otolaryngology - Head & Neck Surgery    Scribe Disclosure:  I, Dottie Salamanca, am serving as a scribe to document services personally performed by Hunter Gallego MD at this visit, based upon the provider's statements to me. All documentation has been reviewed by the aforementioned provider prior to being entered into the official medical record.     Additional portions of the patient's history have been reviewed below.   ~~~~~~~~~~~~~~~~~~~~~~~~~~~~~~~~~~~~~~~~~~~~~~~~~~~~~~~~~~~~~~~~~~~~~~~~~~~~~~~~~~~~~~~~~~~~~~~~~~~~~~~~~~~~~~~~~~~~~~~~~~~~~~~~~~~~~~~    No past medical history on file.     No past surgical history on file.     No family history on file.     Social History     Socioeconomic History     Marital status: Single     Spouse name: None     Number of children: None     Years of education: None     Highest education level: None   Tobacco Use     Smoking status: Never Smoker     Smokeless tobacco: Never Used   Substance and Sexual Activity     Alcohol use: Not Currently

## 2022-07-29 ENCOUNTER — DOCUMENTATION ONLY (OUTPATIENT)
Dept: OTOLARYNGOLOGY | Facility: CLINIC | Age: 52
End: 2022-07-29

## 2022-07-29 NOTE — PROGRESS NOTES
MARÍA 7/22        esperanza Lopez 7/15/22  IMPRESSION/PLAN:   1. GPA with renal involvement  Followed with rheumatology and nephrology. Currently on prednisone 5 mg daily, Imuran 50 mg BID, and folic acid 3 mg daily.  2. Obstructive lung disease  Educated the patient on the importance of compliance with maintenance inhalers. The patient is open to restarting Breo Ellipta 200/25 inhaler 1 puff 1 time daily. He will contact the pulmonary clinic in 1 month for an update of symptoms.  3. Chronic rhinitis  Encouraged the patient to start Allegra/Zyrtec/Claritin daily. He may also restart Flonase nasal spray daily.  4. RLL lung lesion  Will obtain CT chest [facility] in May 2023 for further evaluation of lung nodules, including the RLL lung lesion.

## 2022-08-01 ENCOUNTER — TELEPHONE (OUTPATIENT)
Dept: OTOLARYNGOLOGY | Facility: CLINIC | Age: 52
End: 2022-08-01

## 2022-08-01 NOTE — TELEPHONE ENCOUNTER
Left vm message for patient in regards to scheduling a sooner appointment. Offered pt 8/2 at 9 am with Cabrera. Writers call back number provided on vm, but ok to schedule as stated if pt calls call center.

## 2022-08-01 NOTE — TELEPHONE ENCOUNTER
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 [phone]  )   6/28/22 CT NECK   11/1/21 CT CHEST     [place] Karsten Jeter (ph. [phone]  )  5/9/22 CT Chest      Care everywhere [facility] ENT 6/9/21 note from Marlen Ortez DO Care everywhere     [facility] Nose [facility] Throat   5/28/2020 note from Vivian Hendricks PA   Care everywhere     [facility] procedures  8/3/2020 PFT  Scanned in care everywhere

## 2022-08-02 NOTE — TELEPHONE ENCOUNTER
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 [phone]  )   6/28/22 CT NECK   11/1/21 CT CHEST      [facility] Karsten Jeter (ph. [phone]  )  5/9/22 CT Chest        Care everywhere [facility] 6/9/21 note from Marlen Ortez DO Care everywhere      [facility] Ear[facility] Nose [facility] Throat   5/28/2020 note from Vivian Hendricks PA   Care everywhere      [facility] procedures  8/3/2020 PFT  Scanned in care everywhere                             8/2/22 3:54PM called [facility] for images, left a message to see if they can push image to [facility] PACS - [facility]  8/3/22 10AM images received in PACS - [facility]

## 2022-08-15 ENCOUNTER — TELEPHONE (OUTPATIENT)
Dept: OTOLARYNGOLOGY | Facility: CLINIC | Age: 52
End: 2022-08-15

## 2022-08-15 NOTE — TELEPHONE ENCOUNTER
Called patient to request reschedule of appointment from 8/18/22 at 1500 to 8/17/22 at 1500.  Patient agreed to schedule change and expressed no concerns during the call.  Treasure Tejada LPN

## 2022-08-17 ENCOUNTER — DOCUMENTATION ONLY (OUTPATIENT)
Dept: OTOLARYNGOLOGY | Facility: CLINIC | Age: 52
End: 2022-08-17

## 2022-08-17 ENCOUNTER — PREP FOR PROCEDURE (OUTPATIENT)
Dept: OTOLARYNGOLOGY | Facility: CLINIC | Age: 52
End: 2022-08-17

## 2022-08-17 ENCOUNTER — OFFICE VISIT (OUTPATIENT)
Dept: OTOLARYNGOLOGY | Facility: CLINIC | Age: 52
End: 2022-08-17
Payer: COMMERCIAL

## 2022-08-17 VITALS
HEIGHT: 74 IN | DIASTOLIC BLOOD PRESSURE: 102 MMHG | WEIGHT: 196.2 LBS | HEART RATE: 64 BPM | OXYGEN SATURATION: 99 % | SYSTOLIC BLOOD PRESSURE: 141 MMHG | BODY MASS INDEX: 25.18 KG/M2

## 2022-08-17 DIAGNOSIS — J38.6 SUBGLOTTIC STENOSIS: Primary | ICD-10-CM

## 2022-08-17 DIAGNOSIS — J38.6 SUBGLOTTIC STENOSIS: ICD-10-CM

## 2022-08-17 DIAGNOSIS — J32.4 CHRONIC PANSINUSITIS: Primary | ICD-10-CM

## 2022-08-17 PROCEDURE — 31575 DIAGNOSTIC LARYNGOSCOPY: CPT | Performed by: OTOLARYNGOLOGY

## 2022-08-17 PROCEDURE — 99215 OFFICE O/P EST HI 40 MIN: CPT | Mod: 25 | Performed by: OTOLARYNGOLOGY

## 2022-08-17 RX ORDER — CEFAZOLIN SODIUM 2 G/50ML
2 SOLUTION INTRAVENOUS
Status: CANCELLED | OUTPATIENT
Start: 2022-08-17

## 2022-08-17 RX ORDER — CEFAZOLIN SODIUM 2 G/50ML
2 SOLUTION INTRAVENOUS SEE ADMIN INSTRUCTIONS
Status: CANCELLED | OUTPATIENT
Start: 2022-08-17

## 2022-08-17 ASSESSMENT — PAIN SCALES - GENERAL: PAINLEVEL: NO PAIN (0)

## 2022-08-17 NOTE — PROGRESS NOTES
Order for hemoglobin A1C was faxed to [facility] lab. Fax number [phone]. 3 pgs faxed.

## 2022-08-17 NOTE — PROGRESS NOTES
[facility]   at the [facility]   Otolaryngology Clinic     Patient: Duglas Ricci    MRN: [id number]    : 1970    Age/Gender: 52 year old male  Date of Service: 2022  Rendering Provider:   Omaira Mercedes MD     Referring Provider   PCP: Edwar Calles  Referring Physician: Hunter Gallego MD  [address] [place] [address]  Reason for Consultation   GPA  Subglottic stenosis    History   HISTORY OF PRESENT ILLNESS: Mr. Ricci is a 52 year old male who presents to us today with GPA.     He presents today for evaluation. He reports:    Dysphonia  - every few days, voice sounds like he has a cold  - then after few hours, it resolves  - denies trouble breathing associated with this    Dysphagia  - denies    Dyspnea  - had difficulty breathing for 5 years  - GPA started in lungs 7 years ago  - breathing is fine when sitting  - but after walking a bit, it becomes labored  - wheezing, then he coughs trying to catch breath  - hasn't improved with treatment  - denies mucus that blocks breathing  - uses Neti pot x1 daily    Throat clearing/cough  - reports with difficulty breathing    GERD/LPRD   - denies indigestion  Denies back surgery    PAST MEDICAL HISTORY: No past medical history on file.   GPA    PAST SURGICAL HISTORY: No past surgical history on file.    CURRENT MEDICATIONS:   Current Outpatient Medications:      azaTHIOprine (IMURAN) 50 MG tablet, Take 50 mg by mouth , Disp: , Rfl:      azelastine (ASTELIN) 0.1 % nasal spray, Spray 1-2 sprays in nostril, Disp: , Rfl:      budesonide (PULMICORT) 0.5 MG/2ML neb solution, , Disp: , Rfl:      calcitRIOL (ROCALTROL) 0.25 MCG capsule, Take 0.25 mcg by mouth, Disp: , Rfl:      carvedilol (COREG) 6.25 MG tablet, TAKE 1 TABLET BY MOUTH TWICE DAILY WITH MEALS, Disp: , Rfl:      Cholecalciferol (VITAMIN D3) 50 MCG (2000) CAPS, TAKE 1 CAPSULE (50 MCG) BY MOUTH 1 TIME PER DAY, Disp: , Rfl:      folic acid (FOLVITE) 1 MG  tablet, , Disp: , Rfl:      predniSONE (DELTASONE) 5 MG tablet, Take 5 mg by mouth daily, Disp: , Rfl:      gentamicin 80 mg in 1000 ml 0.9% NaCl (GARAMYCIN) SOLN nasal irrigation, Rinse each nasal cavity with 120 ml of mixture twice daily. (Patient not taking: No sig reported), Disp: 1000 mL, Rfl: 0     predniSONE (DELTASONE) 10 MG tablet, Take 4 tabs (40mg)  qd for 5 days, then take 2 tabs (20mg)  qd for 5 days, then take 1 tab (10mg) qd for 5 days. (Patient not taking: No sig reported), Disp: , Rfl:     ALLERGIES: Other environmental allergy, Amlodipine, and Edda-kit bee sting    SOCIAL HISTORY:    Social History     Socioeconomic History     Marital status: Single     Spouse name: Not on file     Number of children: Not on file     Years of education: Not on file     Highest education level: Not on file   Occupational History     Not on file   Tobacco Use     Smoking status: Never Smoker     Smokeless tobacco: Never Used   Substance and Sexual Activity     Alcohol use: Not Currently     Drug use: Not on file     Sexual activity: Not on file   Other Topics Concern     Not on file   Social History Narrative     Not on file     Social Determinants of Health     Financial Resource Strain: Not on file   Food Insecurity: Not on file   Transportation Needs: Not on file   Physical Activity: Not on file   Stress: Not on file   Social Connections: Not on file   Intimate Partner Violence: Not on file   Housing Stability: Not on file         FAMILY HISTORY: No family history on file.  Non-contributory for problems with anesthesia    REVIEW OF SYSTEMS:   The patient was asked a 14 point review of systems regarding constitutional symptoms, eye symptoms, ears, nose, mouth, throat symptoms, cardiovascular symptoms, respiratory symptoms, gastrointestinal symptoms, genitourinary symptoms, musculoskeletal symptoms, integumentary symptoms, neurological symptoms, psychiatric symptoms, endocrine symptoms, hematologic/lymphatic  symptoms, and allergic/ immunologic symptoms.   The pertinent factors have been included in the HPI and below.  Patient Supplied Answers to Review of Systems   ENT ROS 11/12/2021   Neurology: Headache   Ears, Nose, Throat: Nasal congestion or drainage, Hoarseness   Cardiopulmonary: Cough       Physical Examination   The patient underwent a physical examination as described below. The pertinent positive and negative findings are summarized after the description of the examination.  Constitutional: The patient's developmental and nutritional status was assessed. The patient's voice quality was assessed.  Head and Face: The head and face were inspected for deformities. The sinuses were palpated. The salivary glands were palpated. Facial muscle strength was assessed bilaterally.  Eyes: Extraocular movements and primary gaze alignment were assessed.  Ears, Nose, Mouth and Throat: The ears and nose were examined for deformities. The nasal septum, mucosa, and turbinates were inspected by anterior rhinoscopy. The lips, teeth, and gums were examined for abnormalities. The oral mucosa, tongue, palate, tonsils, lateral and posterior pharynx were inspected for the presence of asymmetry or mucosal lesions.    Neck: The tracheal position was noted, and the neck mass palpated to determine if there were any asymmetries, abnormal neck masses, thyromegally, or thyroid nodules.  Respiratory: The nature of the breathing and chest expansion/symmetry was observed.  Cardiovascular: The patient was examined to determine the presence of any edema or jugular venous distension.  Abdomen: The contour of the abdomen was noted.  Lymphatic: The patient was examined for infraclavicular lymphadenopathy.  Musculoskeletal: The patient was inspected for the presence of skeletal deformities.  Extremities: The extremities were examined for any clubbing or cyanosis.  Skin: The skin was examined for inflammatory or neoplastic conditions.  Neurologic:  The patient's orientation, mood, and affect were noted. The cranial nerve  functions were examined.  Other pertinent positive and negative findings on physical examination:      OC/OP: no lesions, uvula midline, soft palate elevates symmetrically   Neck: no lesions, no TH tenderness to palpation  All other physical examination findings were within normal limits and noncontributory.  Procedures   Flexible laryngoscopy (CPT 63743)      Pre-procedure diagnosis: dysphonia  Post-procedure diagnosis: same as above  Indication for procedure: Mr. Ricci is a 52 year old male with see above  Procedure(s): Fiberoptic Laryngoscopy    Details of Procedure: After informed consent was obtained, the patient was seated in the examination chair.  The areas of the nasopharynx as well as the hypopharynx were anesthetized with topical 4% lidocaine with 0.25% phenylephrine atomizer.  Examination of the base of tongue was performed first.  Attention was directed to any evidence of masses in the area or evidence of leukoplakia or candidal infection.  Attention was directed to the epiglottis where its size and position was determined and its movement on phonation of the vowel  e .  The piriform sinuses were then inspected for any mass lesions or pooling of secretions.  Attention was then directed to the larynx. The vocal folds were inspected for infection or any areas of leukoplakia, for masses, polypoid degeneration, or hemorrhage.  Having done this, the arytenoids and vocal processes were inspected for erythema or evidence of granuloma formation.  The posterior commissure was then inspected for evidence of inflammatory changes in the mucosa and heaping up of mucosal tissue. The patient was then instructed to say the vowel  e .  Adduction of vocal folds to the midline was observed for any evidence of paresis or paralysis of the larynx or asymmetry in rotation of the larynx to the left or right. The patient was asked to breathe and the  degree of abduction was noted bilaterally.  Subglottic view of the larynx was obtained for any additional mass lesions or mucosal changes.  Finally the post cricoid was examined for evidence of pooling of secretions, as well as the pharyngeal wall mucosa.   Anesthesia type: 0.25% phenylephrine    Findings:  Anatomic/physiological deviations: RNC, grade 3 75% subglottic narrowing, starts from below vocal folds   Right vocal process: No restriction of mobility   Left vocal process: No restriction of mobility  Glottal gap: Complete glottal closure  Supraglottic structures: Normal  Hypopharynx: Normal     Estimated Blood Loss: minimal  Complications: None  Disposition: Patient tolerated the procedure well           Review of Relevant Clinical Data   I personally reviewed:  Notes:   [facility] Follow-up Visit     Assessment and Plan:     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)  - predniSONE 5 mg tablet; Take 1 tablet (5 mg) by mouth 1 time per day Dispense: 90 tablet; Refill: 1  - folic acid 1 mg tablet; Take 3 tablets (3 mg) by mouth 1 time per day Dispense: 270 tablet; Refill: 1  - azaTHIOprine (IMURAN) 50 MG tablet; Take 1 tablet (50 mg) by mouth 2 times a day Dispense: 180 tablet; Refill: 1  - COMPLETE BLOOD COUNT WITH DIFFERENTIAL; Future  - CREATININE; Future  - HEPATIC FUNCTION PANEL; Future    SOB (shortness of breath) on exertion    Granulomatosis with polyangiitis  ANCA positive small vessel vasculitis  History of rituximab and cyclophosphamide use in the past. Currently on maintenance therapy with azathioprine  Continues to endorse shortness of breath on exertion which is largely stable/mildly improved compared to prior evaluation on 6/13/2022  Reports no active sinusitis symptoms  No oral ulceration  No evidence of active inflammatory arthritis  No evidence of inflammatory myositis  Renal function is stable  Since last office visit patient underwent CT  neck without contrast which showed evidence of opacification of the sinuses consistent with chronic changes of granulomatosis with polyangiitis  Importantly, has soft tissue swelling/thickening of the glottic and infraglottic larynx with mild airspace.  Patient was recommended ENT evaluation.  I reviewed outside records from [facility], saw physician Hunter Gallego 7/22/2022. Underwent laryngoscopy to address voice changes cough and worsening shortness of breath which showed evidence of circumferential scarring of the subglottic larynx. Patient referred to laryngology evaluation is currently pending.  We will forward records shortly with [facility] physician and laryngologist.  In the absence of other systemic manifestations of small vessel vasculitis and will need ENT input regarding whether soft tissue swelling noted on CT scan in conjunction with patient's worsening shortness of breath or voice changes consistent with active GPA manifestations of the upper respiratory tract. If ENT findings are consistent with active small vessel vasculitis manifestations patient will benefit from additional immunosuppressive therapy. Patient will also need ENT evaluation regarding other etiologies including malignancy and an atypical infection due to chronic immunocompromised state.  Patient voiced understanding  For now continue azathioprine 50 mg twice daily [dosed for current renal function] and prednisone 5 mg daily    Long-term high-risk medication use requiring intensive toxicity monitoring  no clinical evidence of toxicity noted combination therapy with azathioprine and low-dose steroids  Reviewed labs 8/11/2022 significant for no cytopenias apart from stable lymphopenia. Normal liver function testing. Has chronic kidney disease that is stable  Continue routine lab monitoring every 3 months    Nursing staff to forward records and drafted letter to [facility] and laryngology    Return in  about 3 months (around 11/15/2022).     7/22/22, Referral, Dr Julián COLIN Keiry is a 52 year old male with a history of GPA with prominent renal involvement, sinus, and moderate lung involvement who presents for follow up. At our last visit (11/12/21) his sinonasal disease appeared stable with no evidence of worsening GPA-related sinusitis. He elected for surveillance and was advised to continue with budesonide rinses. He tells me he still has some crusting on the left side but not as severe as before and his nose is overall doing well. He has continued using budesonide rinses but doesn't notice any significant help from this. He has significant crusts produced every couple weeks out of his left nostril with the rinses. His sense of smell is slightly coming back. He reports no external changes to his nose. He has increased tearing of his eyes bilaterally but only notices occassionally or while watching TV. He denies any facial pressure or facial pain. His kidneys have been relatively stable and he is not currently on the active transplant list. He has continued 5 mg of Prednisone and Imuran with no infusions. He has noticed a more hoarse voice and shortness of breath within a year following onset of his GPA.      His rheumatologist was somewhat concerned given the recent and change in voice and some dyspnea as well.  He asked to have have laryngoscopy be performed at this visit.    7/15/22, Pulmonology, Amirah Lopez, ELIN-CNP    Today, the patient reports his breathing has been stable since his last office visit. His day-to-day respiratory symptoms include shortness of breath with little activity. He also describes a dry cough that is worsened when he is experiencing shortness of breath, as well as wheezing. The patient has been experience head tightness that he attributes to allergies. He has been prescribed nasal sprays in the past, but ultimately discontinued these, as he did not notice benefit. The  patient denies chest tightness, fever, chills, or body aches. Following his last office visit, a CT neck was performed, which showed mild narrowing of the glottic/infraglottic larynx with suggestion of concentric wall thickening. He is now scheduled to see [facility] next month. The patient was also instructed to start Breo Ellipta 200/25 inhaler 1 puff 1 time daily for his obstructive lung disease. He reports that he discontinued this, as he did not notice improvement in his respiratory symptoms. Unfortunately, he was using this inhaler intermittently. The patient is followed with rheumatology for GPA, currently on prednisone 5 mg daily, Imuran 50 mg BID, and folic acid 3 mg daily.    Plan:  2. Obstructive lung disease  Educated the patient on the importance of compliance with maintenance inhalers. The patient is open to restarting Breo Ellipta 200/25 inhaler 1 puff 1 time daily. He will contact the pulmonary clinic in 1 month for an update of symptoms.  3. Chronic rhinitis  Encouraged the patient to start Allegra/Zyrtec/Claritin daily. He may also restart Flonase nasal spray daily.    Radiology:  CT NECK (6/28/22)  1.  Concentric soft tissue thickening in the glottic/infraglottic larynx with mild airway narrowing, new/progressed from 2018. This is nonspecific but potentially relates to the patient's polyangiitis with granulomatosis. Direct inspection could be considered for further characterization.   2.  Extensive opacification of the visualized paranasal sinuses may relate to paranasal sinus disease or polyangiitis with granulomatosis. Absence of the medial walls of the maxillary sinuses may be postsurgical in nature or represent erosive change. Correlate with clinical history.   3.  No adenopathy.       CT Chest (5/9/22)  1.  A right lower lobe nodule exhibiting a benign pattern of calcification has decreased in size, while other pulmonary nodules are stable.   2.  No intrathoracic lymphadenopathy.      Procedures:   7/22/22, Laryngoscopy  Significant for circumferential scarring of the subglottic larynx    FOE 7/22/22 5/9/22  Interpretation:     Pulmonary function test shows irreversible mild obstruction, but no   restriction.   Bronchodilators were given and there was no significant response noted acutely.     Total lung capacity was normal.   Lung volumes do demonstrate air-trapping.   Diffusing capacity was normal.     The flow volume loop is suggestive of obstructive changes in the end expiratory   phase.  There is also blunting of both inspiratory and expiratory limbs   suggesting a fixed intrathoracic/extrathoracic airway obstruction.  Correlate   clinically.     A negative bronchodilator response does not preclude symptomatic improvement.        Labs:  No results found for: TSH  No results found for: NA, CO2, BUN, CREAT, GLUCOSE, PHOS  No results found for: WBC, HGB, HCT, MCV, PLT  No results found for: PT, PTT, INR  No results found for: LOU  No components found for: RHEUMATOIDFACTOR,  RF  No results found for: CRP  No components found for: CKTOT, URICACID  No components found for: C3, C4, DSDNAAB, NDNAABIFA  No results found for: MPOAB    Patient reported Quality of Life (QOL) Measures   Patient Supplied Answers To VHI Questionnaire  No flowsheet data found.      Patient Supplied Answers To EAT Questionnaire  No flowsheet data found.      Patient Supplied Answers To CSI Questionnaire  No flowsheet data found.      Patient Supplied Answers to Dyspnea Index Questionnaire:  No flowsheet data found.    Impression & Plan     IMPRESSION: Mr. Ricci is a 52 year old male who is being seen for the following:    Dyspnea  - due to subglottic stenosis   - intubation history for appendix  - denies diabetes, had it checked 3 years ago  - autoimmune labs - has GPA  - biopsy none  - imaging - CT neck 6/2022 reviewed with subglottic narrowing, 2021 CT neck with the same narrowing  - PFTs 5/2022 - There is  also blunting of both inspiratory and expiratory limbs suggesting a fixed intrathoracic/extrathoracic airway obstruction.  Correlate clinically.   - denies reflux   - BMI is 25.53   - patient symptoms with labored breathing with exertion, nasal crusting  - peak flow meter is 250  - scope shows grade 3 75% subglottic narrowing, starts from below vocal folds  - discussed etiology of trauma (intubation), autoimmune, diabetes or idiopathic. In this case this is most likely autoimmune due to GPA, need to rule out DM  - discussed treatment with observation, conservative management with oral abx/steroids/reflux precautions, steroid injections, endoscopic procedures, open resection and bypass procedure with tracheotomy  - given severity of narrowing and symptomatology given difficulty with walking would recommend intervention with surgery to open up the narrowing before it gets tighter and he is not a candidate for an endoscopic approach  - also this has the appearance of scar rather than active GPA  - patient in agreement and elects to proceed with outpatient surgery   - discussed if narrowing recurs quickly after surgery then this is likely GPA induced and will need increase in immunosuppressive medications  Plan  - schedule surgery  - order hemoglobin A1C  - CC Dr. [facility]      2. Nasal crusting  - in setting of GPA  - has left sided crusting   Plan  - increase Neti pot to x2 daily    RETURN VISIT: after surgery        Thank you for the kind referral and for allowing me to share in the care of Mr. Ricci. If you have any questions, please do not hesitate to contact me.    Omaira Mercedes MD    Laryngology    [facility]  Department of  Otolaryngology - Head and Neck Surgery  Clinics & Surgery Center  [address] [place] [address]  Appointment line: [phone]  Fax: [phone]  [email or web address]   Valencia GRAFF, am serving as  a scribe to document services personally performed by Omaira Mercedes MD at this visit, based upon the provider's statements to me. All documentation has been reviewed by the aforementioned provider prior to being entered into the official medical record.

## 2022-08-17 NOTE — NURSING NOTE
"Chief Complaint   Patient presents with     Consult     Referred from Dr. Gallego for wegener's       Blood pressure (!) 141/102, pulse 64, height 1.867 m (6' 1.5\"), weight 89 kg (196 lb 3.2 oz), SpO2 99 %.    Noemy Eugene, EMT    "

## 2022-08-17 NOTE — PROGRESS NOTES
Patient returning call to writer. Patient confirmed his wife will take him to his appointment, he is not taking antibiotics, and he will hold aspirin and etodolac starting tomorrow. Patient had no further questions or concerns. Relevant Diagnosis: Subglottic Stenosis Procedure 8/31 UU  Teaching Topic: pre-op teaching  Person(s) involved in teaching:  Patient     Teaching Concerns Addressed:  Pre op teaching included the need for an H&P, NPO status pre op, hospital routines, expected recovery, activity  restrictions, antimicrobial scrub, s/s of infection, pain control methods and the importance of follow up appointments.  The patient voiced an understanding of all instructions and will call with questions.     Motivation Level:  Asks Questions:   Yes  Eager to Learn:   Yes  Cooperative:   Yes  Receptive (willing/able to accept information):   Yes     Patient  demonstrates understanding of the following:  Reason for the appointment, diagnosis and treatment plan:   Yes  Knowledge of proper use of medications and conditions for which they are ordered (with special attention to potential side effects or drug interactions):   Yes  Which situations necessitate calling provider and whom to contact:   Yes        Proper use and care of  (medical equip, care aids, etc.):   NA  Nutritional needs and diet plan:   Yes  Pain management techniques:   Yes  Patient instructed on hand hygiene:  Yes  How and/when to access community resources:   NA     Infection Prevention:  Patient   demonstrates understanding of the following:  Surgical procedure site care taught   Signs and symptoms of infection taught Yes  Wound care taught Yes     Instructional Materials Used/Given: AVS instructions, surgery , surgery soap.

## 2022-08-17 NOTE — LETTER
2022       RE: Duglas Ricci  [address]  [facility] [address]     Dear Colleague,    Thank you for referring your patient, Duglas Ricci, to the [facility]. Please see a copy of my visit note below.        [facility]   at the [facility]   Otolaryngology Clinic     Patient: Duglas Ricci    MRN: [id number]    : 1970    Age/Gender: 52 year old male  Date of Service: 2022  Rendering Provider:   Omaira Mercedes MD     Referring Provider   PCP: Edwar Calles  Referring Physician: Hunter Gallego MD  [address] [place] [address]  Reason for Consultation   GPA  Subglottic stenosis    History   HISTORY OF PRESENT ILLNESS: Mr. Ricci is a 52 year old male who presents to us today with GPA.     He presents today for evaluation. He reports:    Dysphonia  - every few days, voice sounds like he has a cold  - then after few hours, it resolves  - denies trouble breathing associated with this    Dysphagia  - denies    Dyspnea  - had difficulty breathing for 5 years  - GPA started in lungs 7 years ago  - breathing is fine when sitting  - but after walking a bit, it becomes labored  - wheezing, then he coughs trying to catch breath  - hasn't improved with treatment  - denies mucus that blocks breathing  - uses Neti pot x1 daily    Throat clearing/cough  - reports with difficulty breathing    GERD/LPRD   - denies indigestion  Denies back surgery    PAST MEDICAL HISTORY: No past medical history on file.   GPA    PAST SURGICAL HISTORY: No past surgical history on file.    CURRENT MEDICATIONS:   Current Outpatient Medications:      azaTHIOprine (IMURAN) 50 MG tablet, Take 50 mg by mouth , Disp: , Rfl:      azelastine (ASTELIN) 0.1 % nasal spray, Spray 1-2 sprays in nostril, Disp: , Rfl:      budesonide (PULMICORT) 0.5 MG/2ML neb solution, , Disp: , Rfl:      calcitRIOL  (ROCALTROL) 0.25 MCG capsule, Take 0.25 mcg by mouth, Disp: , Rfl:      carvedilol (COREG) 6.25 MG tablet, TAKE 1 TABLET BY MOUTH TWICE DAILY WITH MEALS, Disp: , Rfl:      Cholecalciferol (VITAMIN D3) 50 MCG (2000 UT) CAPS, TAKE 1 CAPSULE (50 MCG) BY MOUTH 1 TIME PER DAY, Disp: , Rfl:      folic acid (FOLVITE) 1 MG tablet, , Disp: , Rfl:      predniSONE (DELTASONE) 5 MG tablet, Take 5 mg by mouth daily, Disp: , Rfl:      gentamicin 80 mg in 1000 ml 0.9% NaCl (GARAMYCIN) SOLN nasal irrigation, Rinse each nasal cavity with 120 ml of mixture twice daily. (Patient not taking: No sig reported), Disp: 1000 mL, Rfl: 0     predniSONE (DELTASONE) 10 MG tablet, Take 4 tabs (40mg)  qd for 5 days, then take 2 tabs (20mg)  qd for 5 days, then take 1 tab (10mg) qd for 5 days. (Patient not taking: No sig reported), Disp: , Rfl:     ALLERGIES: Other environmental allergy, Amlodipine, and Edda-kit bee sting    SOCIAL HISTORY:    Social History     Socioeconomic History     Marital status: Single     Spouse name: Not on file     Number of children: Not on file     Years of education: Not on file     Highest education level: Not on file   Occupational History     Not on file   Tobacco Use     Smoking status: Never Smoker     Smokeless tobacco: Never Used   Substance and Sexual Activity     Alcohol use: Not Currently     Drug use: Not on file     Sexual activity: Not on file   Other Topics Concern     Not on file   Social History Narrative     Not on file     Social Determinants of Health     Financial Resource Strain: Not on file   Food Insecurity: Not on file   Transportation Needs: Not on file   Physical Activity: Not on file   Stress: Not on file   Social Connections: Not on file   Intimate Partner Violence: Not on file   Housing Stability: Not on file         FAMILY HISTORY: No family history on file.  Non-contributory for problems with anesthesia    REVIEW OF SYSTEMS:   The patient was asked a 14 point review of systems regarding  constitutional symptoms, eye symptoms, ears, nose, mouth, throat symptoms, cardiovascular symptoms, respiratory symptoms, gastrointestinal symptoms, genitourinary symptoms, musculoskeletal symptoms, integumentary symptoms, neurological symptoms, psychiatric symptoms, endocrine symptoms, hematologic/lymphatic symptoms, and allergic/ immunologic symptoms.   The pertinent factors have been included in the HPI and below.  Patient Supplied Answers to Review of Systems  [facility] ROS 11/12/2021   Neurology: Headache   Ears, Nose, Throat: Nasal congestion or drainage, Hoarseness   Cardiopulmonary: Cough       Physical Examination   The patient underwent a physical examination as described below. The pertinent positive and negative findings are summarized after the description of the examination.  Constitutional: The patient's developmental and nutritional status was assessed. The patient's voice quality was assessed.  Head and Face: The head and face were inspected for deformities. The sinuses were palpated. The salivary glands were palpated. Facial muscle strength was assessed bilaterally.  Eyes: Extraocular movements and primary gaze alignment were assessed.  Ears, Nose, Mouth and Throat: The ears and nose were examined for deformities. The nasal septum, mucosa, and turbinates were inspected by anterior rhinoscopy. The lips, teeth, and gums were examined for abnormalities. The oral mucosa, tongue, palate, tonsils, lateral and posterior pharynx were inspected for the presence of asymmetry or mucosal lesions.    Neck: The tracheal position was noted, and the neck mass palpated to determine if there were any asymmetries, abnormal neck masses, thyromegally, or thyroid nodules.  Respiratory: The nature of the breathing and chest expansion/symmetry was observed.  Cardiovascular: The patient was examined to determine the presence of any edema or jugular venous distension.  Abdomen: The contour of the abdomen was noted.  Lymphatic:  The patient was examined for infraclavicular lymphadenopathy.  Musculoskeletal: The patient was inspected for the presence of skeletal deformities.  Extremities: The extremities were examined for any clubbing or cyanosis.  Skin: The skin was examined for inflammatory or neoplastic conditions.  Neurologic: The patient's orientation, mood, and affect were noted. The cranial nerve  functions were examined.  Other pertinent positive and negative findings on physical examination:      OC/OP: no lesions, uvula midline, soft palate elevates symmetrically   Neck: no lesions, no TH tenderness to palpation  All other physical examination findings were within normal limits and noncontributory.  Procedures   Flexible laryngoscopy (CPT 27801)      Pre-procedure diagnosis: dysphonia  Post-procedure diagnosis: same as above  Indication for procedure: Mr. Ricci is a 52 year old male with see above  Procedure(s): Fiberoptic Laryngoscopy    Details of Procedure: After informed consent was obtained, the patient was seated in the examination chair.  The areas of the nasopharynx as well as the hypopharynx were anesthetized with topical 4% lidocaine with 0.25% phenylephrine atomizer.  Examination of the base of tongue was performed first.  Attention was directed to any evidence of masses in the area or evidence of leukoplakia or candidal infection.  Attention was directed to the epiglottis where its size and position was determined and its movement on phonation of the vowel  e .  The piriform sinuses were then inspected for any mass lesions or pooling of secretions.  Attention was then directed to the larynx. The vocal folds were inspected for infection or any areas of leukoplakia, for masses, polypoid degeneration, or hemorrhage.  Having done this, the arytenoids and vocal processes were inspected for erythema or evidence of granuloma formation.  The posterior commissure was then inspected for evidence of inflammatory changes in the  mucosa and heaping up of mucosal tissue. The patient was then instructed to say the vowel  e .  Adduction of vocal folds to the midline was observed for any evidence of paresis or paralysis of the larynx or asymmetry in rotation of the larynx to the left or right. The patient was asked to breathe and the degree of abduction was noted bilaterally.  Subglottic view of the larynx was obtained for any additional mass lesions or mucosal changes.  Finally the post cricoid was examined for evidence of pooling of secretions, as well as the pharyngeal wall mucosa.   Anesthesia type: 0.25% phenylephrine    Findings:  Anatomic/physiological deviations: RNC, grade 3 75% subglottic narrowing, starts from below vocal folds   Right vocal process: No restriction of mobility   Left vocal process: No restriction of mobility  Glottal gap: Complete glottal closure  Supraglottic structures: Normal  Hypopharynx: Normal     Estimated Blood Loss: minimal  Complications: None  Disposition: Patient tolerated the procedure well           Review of Relevant Clinical Data   I personally reviewed:  Notes:   [facility] Follow-up Visit     Assessment and Plan:     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)  - predniSONE 5 mg tablet; Take 1 tablet (5 mg) by mouth 1 time per day Dispense: 90 tablet; Refill: 1  - folic acid 1 mg tablet; Take 3 tablets (3 mg) by mouth 1 time per day Dispense: 270 tablet; Refill: 1  - azaTHIOprine (IMURAN) 50 MG tablet; Take 1 tablet (50 mg) by mouth 2 times a day Dispense: 180 tablet; Refill: 1  - COMPLETE BLOOD COUNT WITH DIFFERENTIAL; Future  - CREATININE; Future  - HEPATIC FUNCTION PANEL; Future    SOB (shortness of breath) on exertion    Granulomatosis with polyangiitis  ANCA positive small vessel vasculitis  History of rituximab and cyclophosphamide use in the past. Currently on maintenance therapy with azathioprine  Continues to endorse shortness of breath on  exertion which is largely stable/mildly improved compared to prior evaluation on 6/13/2022  Reports no active sinusitis symptoms  No oral ulceration  No evidence of active inflammatory arthritis  No evidence of inflammatory myositis  Renal function is stable  Since last office visit patient underwent CT neck without contrast which showed evidence of opacification of the sinuses consistent with chronic changes of granulomatosis with polyangiitis  Importantly, has soft tissue swelling/thickening of the glottic and infraglottic larynx with mild airspace.  Patient was recommended ENT evaluation.  I reviewed outside records from [facility], saw physician Hunter Gallego 7/22/2022. Underwent laryngoscopy to address voice changes cough and worsening shortness of breath which showed evidence of circumferential scarring of the subglottic larynx. Patient referred to laryngology evaluation is currently pending.  We will forward records shortly with [facility] physician and laryngologist.  In the absence of other systemic manifestations of small vessel vasculitis and will need ENT input regarding whether soft tissue swelling noted on CT scan in conjunction with patient's worsening shortness of breath or voice changes consistent with active GPA manifestations of the upper respiratory tract. If ENT findings are consistent with active small vessel vasculitis manifestations patient will benefit from additional immunosuppressive therapy. Patient will also need ENT evaluation regarding other etiologies including malignancy and an atypical infection due to chronic immunocompromised state.  Patient voiced understanding  For now continue azathioprine 50 mg twice daily [dosed for current renal function] and prednisone 5 mg daily    Long-term high-risk medication use requiring intensive toxicity monitoring  no clinical evidence of toxicity noted combination therapy with azathioprine and low-dose steroids  Reviewed labs  8/11/2022 significant for no cytopenias apart from stable lymphopenia. Normal liver function testing. Has chronic kidney disease that is stable  Continue routine lab monitoring every 3 months    Nursing staff to forward records and drafted letter to [facility] and [facility]ology    Return in about 3 months (around 11/15/2022).     7/22/22, Referral, Dr Julián COLIN Keiry is a 52 year old male with a history of GPA with prominent renal involvement, sinus, and moderate lung involvement who presents for follow up. At our last visit (11/12/21) his sinonasal disease appeared stable with no evidence of worsening GPA-related sinusitis. He elected for surveillance and was advised to continue with budesonide rinses. He tells me he still has some crusting on the left side but not as severe as before and his nose is overall doing well. He has continued using budesonide rinses but doesn't notice any significant help from this. He has significant crusts produced every couple weeks out of his left nostril with the rinses. His sense of smell is slightly coming back. He reports no external changes to his nose. He has increased tearing of his eyes bilaterally but only notices occassionally or while watching TV. He denies any facial pressure or facial pain. His kidneys have been relatively stable and he is not currently on the active transplant list. He has continued 5 mg of Prednisone and Imuran with no infusions. He has noticed a more hoarse voice and shortness of breath within a year following onset of his GPA.      His rheumatologist was somewhat concerned given the recent and change in voice and some dyspnea as well.  He asked to have have laryngoscopy be performed at this visit.    7/15/22, Pulmonology, Amirah [facility], APRN-CNP    Today, the patient reports his breathing has been stable since his last office visit. His day-to-day respiratory symptoms include shortness of breath with little activity. He also  describes a dry cough that is worsened when he is experiencing shortness of breath, as well as wheezing. The patient has been experience head tightness that he attributes to allergies. He has been prescribed nasal sprays in the past, but ultimately discontinued these, as he did not notice benefit. The patient denies chest tightness, fever, chills, or body aches. Following his last office visit, a CT neck was performed, which showed mild narrowing of the glottic/infraglottic larynx with suggestion of concentric wall thickening. He is now scheduled to see [facility] next month. The patient was also instructed to start Breo Ellipta 200/25 inhaler 1 puff 1 time daily for his obstructive lung disease. He reports that he discontinued this, as he did not notice improvement in his respiratory symptoms. Unfortunately, he was using this inhaler intermittently. The patient is followed with rheumatology for GPA, currently on prednisone 5 mg daily, Imuran 50 mg BID, and folic acid 3 mg daily.    Plan:  2. Obstructive lung disease  Educated the patient on the importance of compliance with maintenance inhalers. The patient is open to restarting Breo Ellipta 200/25 inhaler 1 puff 1 time daily. He will contact the pulmonary clinic in 1 month for an update of symptoms.  3. Chronic rhinitis  Encouraged the patient to start Allegra/Zyrtec/Claritin daily. He may also restart Flonase nasal spray daily.    Radiology:  CT NECK (6/28/22)  1.  Concentric soft tissue thickening in the glottic/infraglottic larynx with mild airway narrowing, new/progressed from 2018. This is nonspecific but potentially relates to the patient's polyangiitis with granulomatosis. Direct inspection could be considered for further characterization.   2.  Extensive opacification of the visualized paranasal sinuses may relate to paranasal sinus disease or polyangiitis with granulomatosis. Absence of the medial walls of the maxillary sinuses may be  postsurgical in nature or represent erosive change. Correlate with clinical history.   3.  No adenopathy.       CT Chest (5/9/22)  1.  A right lower lobe nodule exhibiting a benign pattern of calcification has decreased in size, while other pulmonary nodules are stable.   2.  No intrathoracic lymphadenopathy.     Procedures:   7/22/22, Laryngoscopy  Significant for circumferential scarring of the subglottic larynx    FOE 7/22/22 5/9/22  Interpretation:     Pulmonary function test shows irreversible mild obstruction, but no   restriction.   Bronchodilators were given and there was no significant response noted acutely.     Total lung capacity was normal.   Lung volumes do demonstrate air-trapping.   Diffusing capacity was normal.     The flow volume loop is suggestive of obstructive changes in the end expiratory   phase.  There is also blunting of both inspiratory and expiratory limbs   suggesting a fixed intrathoracic/extrathoracic airway obstruction.  Correlate   clinically.     A negative bronchodilator response does not preclude symptomatic improvement.        Labs:  No results found for: TSH  No results found for: NA, CO2, BUN, CREAT, GLUCOSE, PHOS  No results found for: WBC, HGB, HCT, MCV, PLT  No results found for: PT, PTT, INR  No results found for: LOU  No components found for: RHEUMATOIDFACTOR,  RF  No results found for: CRP  No components found for: CKTOT, URICACID  No components found for: C3, C4, DSDNAAB, NDNAABIFA  No results found for: MPOAB    Patient reported Quality of Life (QOL) Measures   Patient Supplied Answers To VHI Questionnaire  No flowsheet data found.      Patient Supplied Answers To EAT Questionnaire  No flowsheet data found.      Patient Supplied Answers To CSI Questionnaire  No flowsheet data found.      Patient Supplied Answers to Dyspnea Index Questionnaire:  No flowsheet data found.    Impression & Plan     IMPRESSION: Mr. Ricci is a 52 year old male who is being seen for  the following:    Dyspnea  - due to subglottic stenosis   - intubation history for appendix  - denies diabetes, had it checked 3 years ago  - autoimmune labs - has GPA  - biopsy none  - imaging - CT neck 6/2022 reviewed with subglottic narrowing, 2021 CT neck with the same narrowing  - PFTs 5/2022 - There is also blunting of both inspiratory and expiratory limbs suggesting a fixed intrathoracic/extrathoracic airway obstruction.  Correlate clinically.   - denies reflux   - BMI is 25.53   - patient symptoms with labored breathing with exertion, nasal crusting  - peak flow meter is 250  - scope shows grade 3 75% subglottic narrowing, starts from below vocal folds  - discussed etiology of trauma (intubation), autoimmune, diabetes or idiopathic. In this case this is most likely autoimmune due to GPA, need to rule out DM  - discussed treatment with observation, conservative management with oral abx/steroids/reflux precautions, steroid injections, endoscopic procedures, open resection and bypass procedure with tracheotomy  - given severity of narrowing and symptomatology given difficulty with walking would recommend intervention with surgery to open up the narrowing before it gets tighter and he is not a candidate for an endoscopic approach  - also this has the appearance of scar rather than active GPA  - patient in agreement and elects to proceed with outpatient surgery   - discussed if narrowing recurs quickly after surgery then this is likely GPA induced and will need increase in immunosuppressive medications  Plan  - schedule surgery  - order hemoglobin A1C  - CC Dr. [facility]      2. Nasal crusting  - in setting of GPA  - has left sided crusting   Plan  - increase Neti pot to x2 daily    RETURN VISIT: after surgery        Thank you for the kind referral and for allowing me to share in the care of Mr. Ricci. If you have any questions, please do not hesitate to contact me.    Omaira Mercedes MD  Assistant  Professor  Laryngology    [facility]  Department of  Otolaryngology - Head and Neck Surgery  Clinics & Surgery Center  [address] [place] [id number]  Appointment line: [phone]  Fax: [phone]  [email or web address]   PRERNA Valencia Edward, am serving as a scribe to document services personally performed by Omaira Mercedes MD at this visit, based upon the provider's statements to me. All documentation has been reviewed by the aforementioned provider prior to being entered into the official medical record.       Relevant Diagnosis: Subglottic Stenosis Procedure 8/31 [facility]  Teaching Topic: pre-op teaching  Person(s) involved in teaching:  Patient     Teaching Concerns Addressed:  Pre op teaching included the need for an H&P, NPO status pre op, hospital routines, expected recovery, activity  restrictions, antimicrobial scrub, s/s of infection, pain control methods and the importance of follow up appointments.  The patient voiced an understanding of all instructions and will call with questions.     Motivation Level:  Asks Questions:   Yes  Eager to Learn:   Yes  Cooperative:   Yes  Receptive (willing/able to accept information):   Yes     Patient  demonstrates understanding of the following:  Reason for the appointment, diagnosis and treatment plan:   Yes  Knowledge of proper use of medications and conditions for which they are ordered (with special attention to potential side effects or drug interactions):   Yes  Which situations necessitate calling provider and whom to contact:   Yes        Proper use and care of  (medical equip, care aids, etc.):   NA  Nutritional needs and diet plan:   Yes  Pain management techniques:   Yes  Patient instructed on hand hygiene:  Yes  How and/when to access community resources:   NA     Infection Prevention:  Patient   demonstrates understanding of the following:  Surgical procedure site care taught   Signs and symptoms of  infection taught Yes  Wound care taught Yes     Instructional Materials Used/Given: AVS instructions, surgery , surgery soap.        Again, thank you for allowing me to participate in the care of your patient.      Sincerely,    Omaira Mercedes MD

## 2022-08-17 NOTE — PATIENT INSTRUCTIONS
You were seen in the ENT Clinic today by Dr. Mercedes. If you have any questions or concerns after your appointment, please contact us (see below)      2.   Please return to clinic1 0/11/22 at 3:00 PM with Dr. Mercedes    Surgery Teaching    1. Someone from our scheduling department will call you within the next few days to get you scheduled with your provider for surgery. If no one has called you in one week, please notify us.    2. You must have a physical exam (called  history and physical ) within 30 days of surgery. You may complete this with your primary care provider.   A. If your provider is outside of the [facility] network please have them complete the preoperative forms provided to you in the surgery packet you will be mailed and be sure to have your provider fax them to the appropriate location prior to surgery. For surgery at the [facility] the fax number is:[phone]. For surgery at the [facility] the fax number is [phone].  B. In some cases we may have you see our Preoperative Assessment Center. If we have expressed this to you, our  will set up your appointment with them when they call to set up your surgery.    3. Complete a COVID test 4 days prior to surgery. You will need to have this done regardless of whether you have had the COVID vaccine. If you have the test performed at a clinic outside of the network, you will need to have the test results faxed to us.    4.Covid test required    If you're going home on the same day as your procedure (surgery):  1 to 2 days before your procedure, take an at-home, rapid antigen test. You can buy these at many pharmacy stores. Or you can order free, at-home tests at covid.gov/tests. If you can't find an at-home, rapid antigen test, please schedule a PCR test with [facility] by calling [phone], or visiting [email or web address]. We can't accept tests that are more than 4 days old.  If your test is negative, please take a photo of  "the test. Show the photo to the nurse when you come in for your procedure.  If your test is positive, please see the \"If your test shows you have COVID-19\" section on this page    If you're staying overnight in the hospital:4 days before your procedure, please get a PCR test from a lab.  To schedule a PCR test with [facility], call [phone]. Or, visit   [email or web address].  If your test is negative, please ask the testing location to fax your results to us at [phone].  If your test is positive, please tell your surgeon's office right away. A positive test means that you have COVID-19. We'll probably have to postpone your admission, surgery or procedure. Your care team will discuss this with you. After that, we'll let you know what to do and when you can re-schedule.    5. Ask your doctor what medicines are safe before surgery. For over the counter medications and supplements it is advised that you do NOT TAKE MOTRIN, IBUPROFEN, ASPIRIN, ALEVE, GARLIC SUPPLEMENTS or FISH OIL x 7 days prior to surgery (to prevent excess bleeding and bruising at time of surgery). If your provider advises you to take any medication the morning of surgery you should take this with a sip of water.    6. A few days prior to surgery a nurse will call you to review your health history and instructions for before and after surgery. They will give you your final arrival time based upon your scheduled arrival time for surgery.    7. Call the surgical team if there's any change in your health prior to surgery. Things you should call for include but are not limited to signs of a cold or the flu (sore throat, runny nose, cough, rash, fever). Other things to notify them for is for any open wounds (cuts, scrapes, scratches) near to the surgery site.    8. If you drink alcohol, stop drinking alcohol at least 24 hours before surgery.    9. If you smoke, stop or at least cut down on smoking 24 hours before " surgery.    10.Take a bath or shower the night before and the morning of surgery (as told by your surgeon). Use an antiseptic soap. If your doctor does not give you special soap, buy Hibiclens or Elvia-Stat at the drug store or ask the pharmacist to suggest a brand. You will wash with this from the neck down, washing your hair and face as you would normally.   A. When you are done with your shower please be sure to use clean towels to dry with, have clean linens on your bed, and put on clean clothes each time.   B. DO NOT put on lotion, powder, perfume, deodorant or make-up after bathing.    11. You can eat a normal meal the night before surgery. Do not eat any solid foods or drink any milk products for 8 hours before surgery.     12. You may drink clear liquids until 2 hours before surgery. Clear liquids include water, Gatorade, apple juice and liquids you can see through.    13. No eating or drinking 2 hours prior to surgery until after surgery. Your post op team will review any diet limitations you might have and when you can start eating and drinking again after surgery.      If you have any questions before or after surgery please call:    How to Contact Us:  Send a [employer] message to your provider. Our team will respond to you via [employer]. Occasionally, we will need to call you to get further information.  For urgent matters (Monday-Friday), call the ENT Clinic: [phone] and speak with a call center team member - they will route your call appropriately.   If you'd like to speak directly with a nurse, please find our contact information below. We do our best to check voicemail frequently throughout the day, and will work to call you back within 1-2 days. For urgent matters, please use the general clinic phone numbers listed above      KANDICE Crawford  ENT RN Care Coordinator      Scarlett VENTURA LPN  Direct: [phone]               How to Contact Us:  Send a [employer] message to your provider. Our team will respond  to you via [employer]. Occasionally, we will need to call you to get further information.  For urgent matters (Monday-Friday), call the ENT Clinic: [phone] and speak with a call center team member - they will route your call appropriately.   If you'd like to speak directly with a nurse, please find our contact information below. We do our best to check voicemail frequently throughout the day, and will work to call you back within 1-2 days. For urgent matters, please use the general clinic phone numbers listed above.      KANDICE Crawford  ENT RN Care Coordinator      Scarlett VENTURA LPN  Direct: [phone]

## 2022-08-18 ENCOUNTER — TELEPHONE (OUTPATIENT)
Dept: OTOLARYNGOLOGY | Facility: CLINIC | Age: 52
End: 2022-08-18

## 2022-08-18 ENCOUNTER — PRE VISIT (OUTPATIENT)
Dept: OTOLARYNGOLOGY | Facility: CLINIC | Age: 52
End: 2022-08-18

## 2022-08-18 NOTE — TELEPHONE ENCOUNTER
Left patient a voicemail to schedule MICROLARYNGOSCOPY, flexible bronchoscopy, carbon dioxide laser resection of stenosis, CRE balloon dilation, steroid injection, possible mitomycin application (N/A)  With Dr. Daren Lanza, Surgery Scheduler 8/18/2022 at 4:06 PM

## 2022-08-19 NOTE — TELEPHONE ENCOUNTER
Called patient to schedule surgery with Dr. Mercedes   Date of Surgery: 8/31/2022  Approximate arrival time given:  Yes - 530 am arrival   Location of surgery: [place] OR  Pre-Op H&P: PCP, patient will call PCP   Post-Op Appt Date: 4 weeks    Imaging needed:  No  Discussed COVID-19 Testing: Yes - will arrange either PCR or home test...     Patient aware that pre-op RN will call 2-3 days prior to surgery with arrival time and instructions Yes  Packet sent out: No 08/19/22      All patients questions were answered and was instructed to review surgical packet and call back with any questions or concerns.       Rayne Tan on 8/19/2022 at 10:08 AM

## 2022-08-24 ENCOUNTER — TRANSFERRED RECORDS (OUTPATIENT)
Dept: HEALTH INFORMATION MANAGEMENT | Facility: CLINIC | Age: 52
End: 2022-08-24

## 2022-08-24 ENCOUNTER — DOCUMENTATION ONLY (OUTPATIENT)
Dept: OTOLARYNGOLOGY | Facility: CLINIC | Age: 52
End: 2022-08-24

## 2022-08-24 DIAGNOSIS — Z20.822 ENCOUNTER FOR LABORATORY TESTING FOR COVID-19 VIRUS: Primary | ICD-10-CM

## 2022-08-24 LAB — HBA1C MFR BLD: 5.1 %

## 2022-08-31 ENCOUNTER — HOSPITAL ENCOUNTER (OUTPATIENT)
Facility: CLINIC | Age: 52
Discharge: HOME | End: 2022-08-31
Attending: OTOLARYNGOLOGY | Admitting: OTOLARYNGOLOGY
Payer: COMMERCIAL

## 2022-08-31 ASSESSMENT — ACTIVITIES OF DAILY LIVING (ADL)
ADLS_ACUITY_SCORE: 33

## 2022-09-01 ASSESSMENT — ACTIVITIES OF DAILY LIVING (ADL)
ADLS_ACUITY_SCORE: 33

## 2022-09-02 ENCOUNTER — TELEPHONE (OUTPATIENT)
Dept: OTOLARYNGOLOGY | Facility: CLINIC | Age: 52
End: 2022-09-02

## 2022-09-02 ASSESSMENT — ACTIVITIES OF DAILY LIVING (ADL)
ADLS_ACUITY_SCORE: 33

## 2022-09-02 NOTE — TELEPHONE ENCOUNTER
Called and spoke with pt to inform him there may be a cancellation next week on Wednesday 09/7, but Dr. Mercedes wouldn't know for sure until the day before which would be Tuesday 09/6. Asked pt if the other surgery is cancelled, would he be available to come in for his surgery on 09/07. Pt stated yes, but has concerns about rather or not Dr. Mercedes will test herself for Covid. Pt stated he wouldn't want to be her 1st case as he would hate to arrive for surgery to only have to drive all the way back home without having surgery. Informed pt Dr. Mercedes would give him a call on Tuesday to discuss his concerns and to confirm rather or not he can come for surgery on 09/07. Pt agreed and will wait for a call from Dr. Mercedes.    Deanne Richter on 9/2/2022 at 4:31 PM

## 2022-09-03 ASSESSMENT — ACTIVITIES OF DAILY LIVING (ADL)
ADLS_ACUITY_SCORE: 33

## 2022-09-04 ASSESSMENT — ACTIVITIES OF DAILY LIVING (ADL)
ADLS_ACUITY_SCORE: 33

## 2022-09-06 NOTE — TELEPHONE ENCOUNTER
Called patient to reschedule surgery with Dr. Mercedes. Informed patient that tomorrow will no longer work and we need to reschedule for next available.       Date of Surgery: 10/12/2022  Approximate arrival time given:  Plan for mid-morning arrival [facility] to the [facility]   Location of surgery: [place] OR  Pre-Op H&P: knows another pre-op needed or updated the day of surgery     Post-Op Appt Date: 4 wk post op rescheduled    Imaging needed:  No  Discussed COVID-19 Testing: Yes     Patient aware that pre-op RN will call 2-3 days prior to surgery with arrival time and instructions:  Yes   We are aware that patient does not want early morning arrival to the hospital.       Rayne Tan on 9/6/2022 at 2:44 PM

## 2022-10-03 ENCOUNTER — HEALTH MAINTENANCE LETTER (OUTPATIENT)
Age: 52
End: 2022-10-03

## 2022-10-05 ENCOUNTER — TELEPHONE (OUTPATIENT)
Dept: OTOLARYNGOLOGY | Facility: CLINIC | Age: 52
End: 2022-10-05

## 2022-10-05 ENCOUNTER — DOCUMENTATION ONLY (OUTPATIENT)
Dept: OTOLARYNGOLOGY | Facility: CLINIC | Age: 52
End: 2022-10-05

## 2022-10-05 DIAGNOSIS — Z20.822 ENCOUNTER FOR LABORATORY TESTING FOR COVID-19 VIRUS: Primary | ICD-10-CM

## 2022-10-05 NOTE — TELEPHONE ENCOUNTER
[facility] Call Center    Phone Message    May a detailed message be left on voicemail: yes     Reason for Call: Order(s): Other:   Reason for requested: Need covid test order  Date needed: ASAP- Appt at 11am and they are wondering if pt should have the test now or later.   Provider name: Dr. Brown  Please fax orders to: [phone]      Action Taken: Message routed to:  [facility] ENT    Travel Screening: Not Applicable

## 2022-10-11 ENCOUNTER — ANESTHESIA EVENT (OUTPATIENT)
Dept: SURGERY | Facility: CLINIC | Age: 52
End: 2022-10-11
Payer: COMMERCIAL

## 2022-10-11 ASSESSMENT — COPD QUESTIONNAIRES: COPD: 0

## 2022-10-11 NOTE — ANESTHESIA PREPROCEDURE EVALUATION
Anesthesia Pre-Procedure Evaluation    Patient: Duglas Ricci   MRN: [id number] : 1970        Procedure : Procedure(s):  MICROLARYNGOSCOPY, carbon dioxide laser resection of stenosis, CRE balloon dilation,  possible mitomycin application  BRONCHOSCOPY, FIBEROPTIC          Past Medical History:   Diagnosis Date     Calcified granuloma of lung (H)      CKD (chronic kidney disease)      Granulomatosis with polyangiitis (H)      Hypertension      Pauci-immune vasculitis (H)      Renal disease       Past Surgical History:   Procedure Laterality Date     APPENDECTOMY       COLONOSCOPY       CYSTOSCOPY       EYE SURGERY       RENAL BIOPSY        Allergies   Allergen Reactions     Other Environmental Allergy      Other reaction(s): Blurred Vision     Amlodipine Other (See Comments)     Throat tightness, says it feels like a cold.       Edda-Kit Bee Sting      Other reaction(s): Unknown Reaction      Social History     Tobacco Use     Smoking status: Never     Smokeless tobacco: Never   Substance Use Topics     Alcohol use: Not Currently      Wt Readings from Last 1 Encounters:   22 89 kg (196 lb 3.2 oz)        Anesthesia Evaluation   Pt has had prior anesthetic. Type: MAC.    No history of anesthetic complications       ROS/MED HX  ENT/Pulmonary: Comment: Subglottic stenosis 2/2 granulomatosis with polyangiitis vs GERD    (+) vocal cord abnormalities -  Hoarseness,  (-) asthma and COPD   Neurologic:  - neg neurologic ROS     Cardiovascular: Comment: TTE 2021:  LVEF 50-55%, no RWMA  RV normal    (+) hypertension-----Previous cardiac testing   Echo: Date: 21 Results:     Left Ventricle: Normal left ventricular systolic function. The EF is   visually estimated to be 50-55%.      Aorta: The sinus of Valsalva is dilated measuring 4.0 cm. The ascending   aorta is dilated measuring 4.0 cm.    Stress Test: Date: Results:    ECG Reviewed: Date: 22 Results:  NSR  Cath: Date: Results:      METS/Exercise  Tolerance:  Comment: Functionality limited by SOB 2/2 subglottic stenosis   Hematologic:  - neg hematologic  ROS     Musculoskeletal:  - neg musculoskeletal ROS     GI/Hepatic:  - neg GI/hepatic ROS     Renal/Genitourinary:     (+) renal disease (CKD 4), type: CRI, Pt does not require dialysis,     Endo:     (+) Chronic steroid usage for Other.     Psychiatric/Substance Use:  - neg psychiatric ROS     Infectious Disease:  - neg infectious disease ROS     Malignancy:  - neg malignancy ROS     Other:            Physical Exam    Airway        Mallampati: II   TM distance: > 3 FB   Neck ROM: full   Mouth opening: > 3 cm    Respiratory Devices and Support         Dental       (+) missing    B=Bridge, C=Chipped, L=Loose, M=Missing    Cardiovascular          Rhythm and rate: regular and normal     Pulmonary           breath sounds clear to auscultation           OUTSIDE LABS:  CBC: No results found for: WBC, HGB, HCT, PLT  BMP: No results found for: NA, POTASSIUM, CHLORIDE, CO2, BUN, CR, GLC  COAGS: No results found for: PTT, INR, FIBR  POC: No results found for: BGM, HCG, HCGS  HEPATIC: No results found for: ALBUMIN, PROTTOTAL, ALT, AST, GGT, ALKPHOS, BILITOTAL, BILIDIRECT, SERGIO  OTHER: No results found for: PH, LACT, A1C, JOSE ANGEL, PHOS, MAG, LIPASE, AMYLASE, TSH, T4, T3, CRP, SED    Anesthesia Plan    ASA Status:  3   NPO Status:  NPO Appropriate    Anesthesia Type: General.     - Airway: LMA   Induction: Intravenous.   Maintenance: Balanced.        Consents    Anesthesia Plan(s) and associated risks, benefits, and realistic alternatives discussed. Questions answered and patient/representative(s) expressed understanding.    - Discussed:     - Discussed with:  Patient      - Extended Intubation/Ventilatory Support Discussed: No.      - Patient is DNR/DNI Status: No    Use of blood products discussed: No .     Postoperative Care    Pain management: IV analgesics, Multi-modal analgesia.   PONV prophylaxis: Ondansetron (or  other 5HT-3), Background Propofol Infusion, Dexamethasone or Solumedrol     Comments:    Other Comments: Dental documentation is based on patient self-reporting for any loose or chipped teeth. Any obvious visual dental abnormalities seen in the airway exam is also documented.    Risks of anesthesia was discussed with the patient, that include risk of sore throat and hoarse voice that should be temporary on the order of days, risk of soft tissue laceration (eg. Lip and tongue) that may take about a week to resolve, and a very rare risk of dental injury requiring repair.    Additional risks of anesthesia was discussed with the patient, including heart attack, stroke, blood clots, respiratory issues, and cardiac arrest.    Patient was given opportunity to ask questions and express concerns, which were then all addressed.            Bonnie Adams MD

## 2022-10-12 ENCOUNTER — ANESTHESIA (OUTPATIENT)
Dept: SURGERY | Facility: CLINIC | Age: 52
End: 2022-10-12
Payer: COMMERCIAL

## 2022-10-12 ENCOUNTER — HOSPITAL ENCOUNTER (OUTPATIENT)
Facility: CLINIC | Age: 52
Discharge: HOME | End: 2022-10-12
Attending: OTOLARYNGOLOGY | Admitting: OTOLARYNGOLOGY
Payer: COMMERCIAL

## 2022-10-12 VITALS
TEMPERATURE: 98.5 F | RESPIRATION RATE: 15 BRPM | DIASTOLIC BLOOD PRESSURE: 110 MMHG | OXYGEN SATURATION: 99 % | WEIGHT: 198.19 LBS | HEART RATE: 62 BPM | SYSTOLIC BLOOD PRESSURE: 147 MMHG | BODY MASS INDEX: 26.27 KG/M2 | HEIGHT: 73 IN

## 2022-10-12 DIAGNOSIS — J38.6 SUBGLOTTIC STENOSIS: Primary | ICD-10-CM

## 2022-10-12 LAB
ANION GAP SERPL CALCULATED.3IONS-SCNC: 12 MMOL/L (ref 7–15)
BUN SERPL-MCNC: 31.8 MG/DL (ref 6–20)
CALCIUM SERPL-MCNC: 9.4 MG/DL (ref 8.6–10)
CHLORIDE SERPL-SCNC: 106 MMOL/L (ref 98–107)
CREAT SERPL-MCNC: 3.73 MG/DL (ref 0.67–1.17)
DEPRECATED HCO3 PLAS-SCNC: 22 MMOL/L (ref 22–29)
GFR SERPL CREATININE-BSD FRML MDRD: 19 ML/MIN/1.73M2
GLUCOSE BLDC GLUCOMTR-MCNC: 95 MG/DL (ref 70–99)
GLUCOSE SERPL-MCNC: 93 MG/DL (ref 70–99)
POTASSIUM SERPL-SCNC: 3.9 MMOL/L (ref 3.4–5.3)
SODIUM SERPL-SCNC: 140 MMOL/L (ref 136–145)

## 2022-10-12 PROCEDURE — 250N000011 HC RX IP 250 OP 636: Performed by: STUDENT IN AN ORGANIZED HEALTH CARE EDUCATION/TRAINING PROGRAM

## 2022-10-12 PROCEDURE — 88305 TISSUE EXAM BY PATHOLOGIST: CPT | Mod: 26 | Performed by: PATHOLOGY

## 2022-10-12 PROCEDURE — 88305 TISSUE EXAM BY PATHOLOGIST: CPT | Mod: TC | Performed by: OTOLARYNGOLOGY

## 2022-10-12 PROCEDURE — 31641 BRONCHOSCOPY TREAT BLOCKAGE: CPT | Mod: 22 | Performed by: OTOLARYNGOLOGY

## 2022-10-12 PROCEDURE — 272N000001 HC OR GENERAL SUPPLY STERILE: Performed by: OTOLARYNGOLOGY

## 2022-10-12 PROCEDURE — 710N000010 HC RECOVERY PHASE 1, LEVEL 2, PER MIN: Performed by: OTOLARYNGOLOGY

## 2022-10-12 PROCEDURE — C1726 CATH, BAL DIL, NON-VASCULAR: HCPCS | Performed by: OTOLARYNGOLOGY

## 2022-10-12 PROCEDURE — 250N000013 HC RX MED GY IP 250 OP 250 PS 637: Performed by: ANESTHESIOLOGY

## 2022-10-12 PROCEDURE — 999N000141 HC STATISTIC PRE-PROCEDURE NURSING ASSESSMENT: Performed by: OTOLARYNGOLOGY

## 2022-10-12 PROCEDURE — 370N000017 HC ANESTHESIA TECHNICAL FEE, PER MIN: Performed by: OTOLARYNGOLOGY

## 2022-10-12 PROCEDURE — 31571 LARYNGOSCOP W/VC INJ + SCOPE: CPT | Mod: 22 | Performed by: OTOLARYNGOLOGY

## 2022-10-12 PROCEDURE — 82962 GLUCOSE BLOOD TEST: CPT

## 2022-10-12 PROCEDURE — 31630 BRONCHOSCOPY DILATE/FX REPR: CPT | Mod: 22 | Performed by: OTOLARYNGOLOGY

## 2022-10-12 PROCEDURE — 258N000003 HC RX IP 258 OP 636: Performed by: STUDENT IN AN ORGANIZED HEALTH CARE EDUCATION/TRAINING PROGRAM

## 2022-10-12 PROCEDURE — 250N000025 HC SEVOFLURANE, PER MIN: Performed by: OTOLARYNGOLOGY

## 2022-10-12 PROCEDURE — 360N000077 HC SURGERY LEVEL 4, PER MIN: Performed by: OTOLARYNGOLOGY

## 2022-10-12 PROCEDURE — 31528 LARYNGOSCOPY AND DILATION: CPT | Mod: 22 | Performed by: OTOLARYNGOLOGY

## 2022-10-12 PROCEDURE — 80048 BASIC METABOLIC PNL TOTAL CA: CPT | Performed by: ANESTHESIOLOGY

## 2022-10-12 PROCEDURE — 710N000012 HC RECOVERY PHASE 2, PER MINUTE: Performed by: OTOLARYNGOLOGY

## 2022-10-12 PROCEDURE — 250N000009 HC RX 250: Performed by: STUDENT IN AN ORGANIZED HEALTH CARE EDUCATION/TRAINING PROGRAM

## 2022-10-12 PROCEDURE — 31541 LARYNSCOP W/TUMR EXC + SCOPE: CPT | Mod: 22 | Performed by: OTOLARYNGOLOGY

## 2022-10-12 PROCEDURE — 36415 COLL VENOUS BLD VENIPUNCTURE: CPT | Performed by: ANESTHESIOLOGY

## 2022-10-12 PROCEDURE — 250N000009 HC RX 250: Performed by: OTOLARYNGOLOGY

## 2022-10-12 PROCEDURE — 250N000011 HC RX IP 250 OP 636: Performed by: OTOLARYNGOLOGY

## 2022-10-12 RX ORDER — FENTANYL CITRATE 50 UG/ML
25 INJECTION, SOLUTION INTRAMUSCULAR; INTRAVENOUS
Status: DISCONTINUED | OUTPATIENT
Start: 2022-10-12 | End: 2022-10-12 | Stop reason: HOSPADM

## 2022-10-12 RX ORDER — DEXAMETHASONE SODIUM PHOSPHATE 4 MG/ML
INJECTION, SOLUTION INTRA-ARTICULAR; INTRALESIONAL; INTRAMUSCULAR; INTRAVENOUS; SOFT TISSUE PRN
Status: DISCONTINUED | OUTPATIENT
Start: 2022-10-12 | End: 2022-10-12

## 2022-10-12 RX ORDER — ONDANSETRON 2 MG/ML
4 INJECTION INTRAMUSCULAR; INTRAVENOUS EVERY 30 MIN PRN
Status: DISCONTINUED | OUTPATIENT
Start: 2022-10-12 | End: 2022-10-12 | Stop reason: HOSPADM

## 2022-10-12 RX ORDER — ONDANSETRON 4 MG/1
4 TABLET, ORALLY DISINTEGRATING ORAL EVERY 30 MIN PRN
Status: DISCONTINUED | OUTPATIENT
Start: 2022-10-12 | End: 2022-10-12 | Stop reason: HOSPADM

## 2022-10-12 RX ORDER — PROPOFOL 10 MG/ML
INJECTION, EMULSION INTRAVENOUS CONTINUOUS PRN
Status: DISCONTINUED | OUTPATIENT
Start: 2022-10-12 | End: 2022-10-12

## 2022-10-12 RX ORDER — LIDOCAINE 40 MG/G
CREAM TOPICAL
Status: DISCONTINUED | OUTPATIENT
Start: 2022-10-12 | End: 2022-10-12 | Stop reason: HOSPADM

## 2022-10-12 RX ORDER — AZITHROMYCIN 250 MG/1
TABLET, FILM COATED ORAL
Qty: 6 TABLET | Refills: 0 | Status: SHIPPED | OUTPATIENT
Start: 2022-10-12 | End: 2022-10-17

## 2022-10-12 RX ORDER — MEPERIDINE HYDROCHLORIDE 25 MG/ML
12.5 INJECTION INTRAMUSCULAR; INTRAVENOUS; SUBCUTANEOUS
Status: DISCONTINUED | OUTPATIENT
Start: 2022-10-12 | End: 2022-10-12 | Stop reason: HOSPADM

## 2022-10-12 RX ORDER — TRIAMCINOLONE ACETONIDE 40 MG/ML
INJECTION, SUSPENSION INTRA-ARTICULAR; INTRAMUSCULAR PRN
Status: DISCONTINUED | OUTPATIENT
Start: 2022-10-12 | End: 2022-10-12 | Stop reason: HOSPADM

## 2022-10-12 RX ORDER — OXYMETAZOLINE HYDROCHLORIDE 0.05 G/100ML
SPRAY NASAL PRN
Status: DISCONTINUED | OUTPATIENT
Start: 2022-10-12 | End: 2022-10-12 | Stop reason: HOSPADM

## 2022-10-12 RX ORDER — OXYCODONE HYDROCHLORIDE 5 MG/1
5 TABLET ORAL EVERY 4 HOURS PRN
Status: DISCONTINUED | OUTPATIENT
Start: 2022-10-12 | End: 2022-10-12 | Stop reason: HOSPADM

## 2022-10-12 RX ORDER — FENTANYL CITRATE 50 UG/ML
25 INJECTION, SOLUTION INTRAMUSCULAR; INTRAVENOUS EVERY 5 MIN PRN
Status: DISCONTINUED | OUTPATIENT
Start: 2022-10-12 | End: 2022-10-12 | Stop reason: HOSPADM

## 2022-10-12 RX ORDER — SODIUM CHLORIDE, SODIUM LACTATE, POTASSIUM CHLORIDE, CALCIUM CHLORIDE 600; 310; 30; 20 MG/100ML; MG/100ML; MG/100ML; MG/100ML
INJECTION, SOLUTION INTRAVENOUS CONTINUOUS
Status: DISCONTINUED | OUTPATIENT
Start: 2022-10-12 | End: 2022-10-12 | Stop reason: HOSPADM

## 2022-10-12 RX ORDER — HYDROMORPHONE HYDROCHLORIDE 1 MG/ML
0.2 INJECTION, SOLUTION INTRAMUSCULAR; INTRAVENOUS; SUBCUTANEOUS EVERY 5 MIN PRN
Status: DISCONTINUED | OUTPATIENT
Start: 2022-10-12 | End: 2022-10-12 | Stop reason: HOSPADM

## 2022-10-12 RX ORDER — LIDOCAINE HYDROCHLORIDE 40 MG/ML
SOLUTION TOPICAL PRN
Status: DISCONTINUED | OUTPATIENT
Start: 2022-10-12 | End: 2022-10-12 | Stop reason: HOSPADM

## 2022-10-12 RX ORDER — ACETAMINOPHEN 325 MG/1
975 TABLET ORAL ONCE
Status: COMPLETED | OUTPATIENT
Start: 2022-10-12 | End: 2022-10-12

## 2022-10-12 RX ORDER — ONDANSETRON 2 MG/ML
INJECTION INTRAMUSCULAR; INTRAVENOUS PRN
Status: DISCONTINUED | OUTPATIENT
Start: 2022-10-12 | End: 2022-10-12

## 2022-10-12 RX ORDER — FENTANYL CITRATE 50 UG/ML
INJECTION, SOLUTION INTRAMUSCULAR; INTRAVENOUS PRN
Status: DISCONTINUED | OUTPATIENT
Start: 2022-10-12 | End: 2022-10-12

## 2022-10-12 RX ADMIN — FENTANYL CITRATE 50 MCG: 50 INJECTION, SOLUTION INTRAMUSCULAR; INTRAVENOUS at 12:11

## 2022-10-12 RX ADMIN — ACETAMINOPHEN 975 MG: 325 TABLET, FILM COATED ORAL at 09:59

## 2022-10-12 RX ADMIN — SODIUM CHLORIDE, POTASSIUM CHLORIDE, SODIUM LACTATE AND CALCIUM CHLORIDE: 600; 310; 30; 20 INJECTION, SOLUTION INTRAVENOUS at 11:37

## 2022-10-12 RX ADMIN — PROPOFOL 40 MG: 10 INJECTION, EMULSION INTRAVENOUS at 12:40

## 2022-10-12 RX ADMIN — FENTANYL CITRATE 50 MCG: 50 INJECTION, SOLUTION INTRAMUSCULAR; INTRAVENOUS at 12:16

## 2022-10-12 RX ADMIN — PHENYLEPHRINE HYDROCHLORIDE 100 MCG: 10 INJECTION INTRAVENOUS at 12:18

## 2022-10-12 RX ADMIN — PROPOFOL 40 MG: 10 INJECTION, EMULSION INTRAVENOUS at 12:32

## 2022-10-12 RX ADMIN — PHENYLEPHRINE HYDROCHLORIDE 100 MCG: 10 INJECTION INTRAVENOUS at 12:25

## 2022-10-12 RX ADMIN — PROPOFOL 120 MCG/KG/MIN: 10 INJECTION, EMULSION INTRAVENOUS at 11:50

## 2022-10-12 RX ADMIN — Medication 20 MG: at 13:11

## 2022-10-12 RX ADMIN — PHENYLEPHRINE HYDROCHLORIDE 100 MCG: 10 INJECTION INTRAVENOUS at 12:36

## 2022-10-12 RX ADMIN — DEXAMETHASONE SODIUM PHOSPHATE 10 MG: 4 INJECTION, SOLUTION INTRA-ARTICULAR; INTRALESIONAL; INTRAMUSCULAR; INTRAVENOUS; SOFT TISSUE at 11:50

## 2022-10-12 RX ADMIN — SUGAMMADEX 200 MG: 100 INJECTION, SOLUTION INTRAVENOUS at 13:37

## 2022-10-12 RX ADMIN — ONDANSETRON 4 MG: 2 INJECTION INTRAMUSCULAR; INTRAVENOUS at 13:33

## 2022-10-12 RX ADMIN — Medication 40 MG: at 12:40

## 2022-10-12 ASSESSMENT — ACTIVITIES OF DAILY LIVING (ADL)
ADLS_ACUITY_SCORE: 18
ADLS_ACUITY_SCORE: 33
ADLS_ACUITY_SCORE: 18

## 2022-10-12 NOTE — DISCHARGE INSTRUCTIONS
[facility]  Same-Day Surgery   Adult Discharge Orders & Instructions     For 24 hours after surgery    Get plenty of rest.  A responsible adult must stay with you for at least 24 hours after you leave the hospital.   Do not drive or use heavy equipment.  If you have weakness or tingling, don't drive or use heavy equipment until this feeling goes away.  Do not drink alcohol.  Avoid strenuous or risky activities.  Ask for help when climbing stairs.   You may feel lightheaded.  IF so, sit for a few minutes before standing.  Have someone help you get up.   If you have nausea (feel sick to your stomach): Drink only clear liquids such as apple juice, ginger ale, broth or 7-Up.  Rest may also help.  Be sure to drink enough fluids.  Move to a regular diet as you feel able.  You may have a slight fever. Call the doctor if your fever is over 100 F (37.7 C) (taken under the tongue) or lasts longer than 24 hours.  You may have a dry mouth, a sore throat, muscle aches or trouble sleeping.  These should go away after 24 hours.  Do not make important or legal decisions.   Call your doctor for any of the followin.  Signs of infection (fever, growing tenderness at the surgery site, a large amount of drainage or bleeding, severe pain, foul-smelling drainage, redness, swelling).    2. It has been over 8 to 10 hours since surgery and you are still not able to urinate (pass water).    3.  Headache for over 24 hours.      To contact a doctor, call Dr Mercedes [phone] [CLINIC] or:    '   [phone] and ask for the resident on call for  Otolaryngology (answered 24 hours a day)  '   Emergency Department:    [facility]: [phone]       (TTY for hearing impaired: [phone])

## 2022-10-12 NOTE — OP NOTE
Operative Note   Otolaryngology - Head and Neck Surgery       DATE OF OPERATION:   October 12, 2022    PREOPERATIVE DIAGNOSIS:   Subglottic laryngeal stenosis  History of GPA     POSTOPERATIVE DIAGNOSIS:   Same    NAME OF OPERATION:   1. Flexible bronchoscopy with CO2 laser of subglottic stenosis.   2. Flexible bronchoscopy with balloon dilation of subglottic stenosis to 12 mmHg   3. Microlaryngoscopy with CO2 laser of subglottic stenosis  4. Microlaryngoscopy with injection of kenalog steroid to subglottic larynx  5. Microlaryngoscopy with balloon dilation of subglottic stenosis to 15 mmHg   6. Microlaryngoscopy with biopsy    ANESTHESIA  Type: General   LMA  To ossoff with jet and 5.0 ett  Woke up on 5.0 ett    SURGEON:   Omaira Mercedes MD    ASSISTANT SURGEON(S):   Celestino Patel MD    INDICATIONS FOR PROCEDURE:   The patient is a 52 year old male with a history of subglottic stenosis. he is being brought to the Operating Room for initial treatment of the stenosis. Risks, benefits, alternatives, and rationale for the procedure(s) listed above were discussed with the patient, and the patient wishes to proceed with surgery and has signed an informed consent.     COMPLEXITY  This surgery was more complex than normally because of performing the surgery with history of GPA and very thick scar. The approach therefore was more difficult technically than normally.  Significantly more time was required to perform all parts of the operation, especially the procedure needed two different approaches to take care of the posterior scar band.     FINDINGS:   1. First part of the procedure done via LMA with spontaneous ventilation, second part was done with exposure with the ossoff and jet ventilation as well as apneic with 5.0 ett in between through the laryngoscope  2. Subglottic stenosis: 1.0cm distal to the vocal folds, 1.0cm thick, grade3, 80 % narrowing - thick posterior scar band   3. CRE balloon to 15 mmHg  4. No  tracheomalacia with collapse of posterior tracheal wall      DESCRIPTION OF PROCEDURE:   The patient was brought into the operating room and placed supine on the operating room table. A time-out was performed. General anesthesia was induced. The patient was an easy mask ventilated. Then the patient was ventilated with a LMA.     The head of the bed was turned 90 degrees to the right. A flexible bronchoscope was placed through the LMA. 4% LTA was infused over the glottic entry. Once the topical anesthesia had been placed, the flexible bronchoscope was placed through the vocal cords. The patient had evidence of approximately grade 3, 80% subglottic and tracheal narrowing. There was a scar bridge circumferentially but thickest was posteriorly      The procedure began with first performing CO2 laser. The flexible waveguide CO2 laser was threaded through the bronchoscope. A laser time-out was performed. The patient's face and eyes were protected with moist towels. The oxygen was subsequently reduced to less than 30%. Radial cuts were made within the scar band starting anteriorly through the scar bridge and then more cuts were made laterally and posteriorly.     The patient was then allowed to obtain adequate saturation and the balloon dilator was then placed through the flexible bronchoscope. Dilations were performed to 12 mmHg. Once the subglottic area had been adequately dilated, the patient was once again allowed to obtain 100% saturation.     Then the  LMA was removed and the dentition and mucosa were inspected prior to start. A reinforced tooth guard was placed on the upper dentition. The ossoff laryngoscope was carefully introduced into the oral cavity and gently passed to the oropharynx. Here the glottis was exposed and the patient was placed in suspension.      The jet needle was placed and ventilation with good chest rise was confirmed.     Then a laser time out was performed.The patient's face and eyes were  protected with moist towels. The oxygen was subsequently reduced to less than 30%. The safety protocols were confirmed. The flexible fiber CO2 laser was threaded through the handpiece. Then under direct visualization with a 0 degree telescope, radial cuts were made of the posterior scar band.    Then the CRE balloon dilator was brought into the field and dilations were performed to 15 mmHg.     Then 1.0 ml of kenalog 40 steroid  was injected into the scar.     Biopsy of the subglottis was also performed and the specimen was sent for permanent histopathologic evaluation.      The subglottis was now fully open. This was the end of our procedure. Afrin soaked pledgets were applied to the surgical site for hemostasis.  The surgical site was then inspected and no residual bleeding was seen. Then the patient was intubated with a 5.0 ett through the laryngoscope. The patient was taken out of suspension. The instrumentation was carefully removed, examining the mucosa and dentition on the way out. The dental guard was removed, and the mucosa and dentition were seen to be in their preoperative state at the conclusion of the procedure. The patient was then handed back to the care of anesthesia who awoke and extubated the patient without complication. The patient was then transferred to the PACU.     COMPLICATIONS:   None.    ESTIMATED BLOOD LOSS:   Less than 10cc    DISPOSITION:   PACU.    SPECIMENS:  ID Type Source Tests Collected by Time Destination   1 : Subglottic biopsy Tissue Other SURGICAL PATHOLOGY EXAM Omaira Mercedes MD 10/12/2022  1:31 PM           PHOTODOCUMENTATION:

## 2022-10-12 NOTE — BRIEF OP NOTE
[facility]    Brief Operative Note    Pre-operative diagnosis: Subglottic stenosis [J38.6]  Post-operative diagnosis Same as pre-operative diagnosis    Procedure: Procedure(s):  MICROLARYNGOSCOPY, carbon dioxide laser resection of stenosis, CRE balloon dilation, steroid injection  Flexible bronchoscopy  Surgeon: Surgeon(s) and Role:     * Omaira Mercedes MD - Primary     * Celestino Patel MD - Resident - Assisting  Anesthesia: General   Estimated Blood Loss: Minimal    Drains: None  Specimens:   ID Type Source Tests Collected by Time Destination   1 : Subglottic biopsy Tissue Other SURGICAL PATHOLOGY EXAM Omaira Mercedes MD 10/12/2022  1:31 PM      Findings:   ~70% stenosis dilated to ~40%  Complications: None.  Implants: * No implants in log *

## 2022-10-12 NOTE — ANESTHESIA POSTPROCEDURE EVALUATION
Patient: Duglas Ricci    Procedure: Procedure(s):  MICROLARYNGOSCOPY, carbon dioxide laser resection of stenosis, CRE balloon dilation, steroid injection, biopsy  Flexible bronchoscopy       Anesthesia Type:  General    Note:  Disposition: Outpatient   Postop Pain Control: Uneventful            Sign Out: Well controlled pain   PONV: No   Neuro/Psych: Uneventful            Sign Out: Acceptable/Baseline neuro status   Airway/Respiratory: Uneventful            Sign Out: Acceptable/Baseline resp. status   CV/Hemodynamics: Uneventful            Sign Out: Acceptable CV status; No obvious hypovolemia; No obvious fluid overload   Other NRE: NONE   DID A NON-ROUTINE EVENT OCCUR? No           Last vitals:  Vitals Value Taken Time   /96 10/12/22 1445   Temp 36.7  C (98.1  F) 10/12/22 1445   Pulse 57 10/12/22 1455   Resp 14 10/12/22 1445   SpO2 97 % 10/12/22 1455   Vitals shown include unvalidated device data.    Electronically Signed By: Edinson Garcia MD  October 12, 2022  2:57 PM

## 2022-10-12 NOTE — ANESTHESIA PROCEDURE NOTES
Airway       Patient location during procedure: OR  Staff -        Anesthesiologist:  Efren Barr MD       Resident/Fellow: Bonnie Adams MD       Performed By: residentIndications and Patient Condition       Indications for airway management: kimber-procedural       Induction type:inhalational       Mask difficulty assessment: 1 - vent by mask    Final Airway Details       Final airway type: supraglottic airway    Supraglottic Airway Details        Type: LMA       Brand: Other (comment) (LMA classic)       LMA size: 5    Post intubation assessment        Placement verified by: capnometry and chest rise        Number of attempts at approach: 2       Number of other approaches attempted: 0       Secured with: silk tape       Ease of procedure: easy       Dentition: Intact and Unchanged

## 2022-10-12 NOTE — ANESTHESIA CARE TRANSFER NOTE
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    Anesthesia Type:   General     Note:    Oropharynx: oropharynx clear of all foreign objects and spontaneously breathing  Level of Consciousness: awake  Oxygen Supplementation: face mask  Level of Supplemental Oxygen (L/min / FiO2): 6  Independent Airway: airway patency satisfactory and stable  Dentition: dentition unchanged  Vital Signs Stable: post-procedure vital signs reviewed and stable  Report to RN Given: handoff report given  Patient transferred to: PACU    Handoff Report: Identifed the Patient, Identified the Reponsible Provider, Reviewed the pertinent medical history, Discussed the surgical course, Reviewed Intra-OP anesthesia mangement and issues during anesthesia, Set expectations for post-procedure period and Allowed opportunity for questions and acknowledgement of understanding      Vitals:  Vitals Value Taken Time   /93 10/12/22 1400   Temp     Pulse 66 10/12/22 1403   Resp     SpO2 100 % 10/12/22 1403   Vitals shown include unvalidated device data.    Electronically Signed By: Bonnie Adams MD  October 12, 2022  2:07 PM

## 2022-10-13 ENCOUNTER — TELEPHONE (OUTPATIENT)
Dept: OTOLARYNGOLOGY | Facility: CLINIC | Age: 52
End: 2022-10-13

## 2022-10-13 ENCOUNTER — PRE VISIT (OUTPATIENT)
Dept: OTOLARYNGOLOGY | Facility: CLINIC | Age: 52
End: 2022-10-13

## 2022-10-13 NOTE — TELEPHONE ENCOUNTER
Patient was contacted by provider to clarify the use of prednisone. No further action taken on this encounter.

## 2022-10-13 NOTE — TELEPHONE ENCOUNTER
Health Call Center    Phone Message    May a detailed message be left on voicemail: yes     Reason for Call: Medication Question or concern regarding medication   Prescription Clarification  Name of Medication: Prednisone  Prescribing Provider: Dr. Mercedes   Pharmacy: [facility] in [place], [place]   What on the order needs clarification? Pt wondering if this medication can be refilled. Pt states that he thought he has Prednisone at home but he does not have enough for the dose that Dr. Mercedes wanted him on. Needs the Rx sent to [facility]. Please call pt to discuss. Thank you.           Action Taken: Message routed to:  [facility] ([facility] ENT    Travel Screening: Not Applicable

## 2022-10-14 LAB
PATH REPORT.COMMENTS IMP SPEC: NORMAL
PATH REPORT.FINAL DX SPEC: NORMAL
PATH REPORT.GROSS SPEC: NORMAL
PATH REPORT.MICROSCOPIC SPEC OTHER STN: NORMAL
PATH REPORT.RELEVANT HX SPEC: NORMAL
PHOTO IMAGE: NORMAL

## 2022-11-10 ENCOUNTER — OFFICE VISIT (OUTPATIENT)
Dept: OTOLARYNGOLOGY | Facility: CLINIC | Age: 52
End: 2022-11-10
Payer: COMMERCIAL

## 2022-11-10 VITALS
OXYGEN SATURATION: 98 % | SYSTOLIC BLOOD PRESSURE: 142 MMHG | HEIGHT: 73 IN | WEIGHT: 198 LBS | HEART RATE: 76 BPM | BODY MASS INDEX: 26.24 KG/M2 | DIASTOLIC BLOOD PRESSURE: 101 MMHG

## 2022-11-10 DIAGNOSIS — J38.6 SUBGLOTTIC STENOSIS: ICD-10-CM

## 2022-11-10 DIAGNOSIS — J32.4 CHRONIC PANSINUSITIS: Primary | ICD-10-CM

## 2022-11-10 PROCEDURE — 31575 DIAGNOSTIC LARYNGOSCOPY: CPT | Performed by: OTOLARYNGOLOGY

## 2022-11-10 PROCEDURE — 99214 OFFICE O/P EST MOD 30 MIN: CPT | Mod: 25 | Performed by: OTOLARYNGOLOGY

## 2022-11-10 ASSESSMENT — PAIN SCALES - GENERAL: PAINLEVEL: NO PAIN (0)

## 2022-11-10 NOTE — PATIENT INSTRUCTIONS
1.  You were seen in the ENT Clinic today by . If you have any questions or concerns after your appointment, please call [phone]. Press option #1 for scheduling related needs. Press option #3 for Nurse advice.    2.  Plan is to return to clinic in mid march      Scarlett Arriaga LPN  [phone]  [facility] - Otolaryngology

## 2022-11-10 NOTE — PROGRESS NOTES
[facility]   at the [facility]   Otolaryngology Clinic     Patient: Duglas Ricci    MRN: [id number]    : 1970    Age/Gender: 52 year old male  Date of Service: 11/10/2022  Rendering Provider:   Omaira Mercedes MD     Chief Complaint   GPA  Subglottic stenosis  S/p MDL with CO2 laser, CRE balloon dilation to 15mmHg and steroid injection 10/12/22  Interval History   HISTORY OF PRESENT ILLNESS: Mr. Ricci is a 52 year old male is being followed for SGS. he was initially seen on 22. Please refer to this note for full history.     Today, he presents for follow up. he reports:  - doing well  - peak flow is 460  - able to walk without issues  - has some mucus at times after meals  - is using inhaler prescribed by pulm     PAST MEDICAL HISTORY:   Past Medical History:   Diagnosis Date     Calcified granuloma of lung (H)      CKD (chronic kidney disease)      Granulomatosis with polyangiitis (H)      Hypertension      Pauci-immune vasculitis (H)      Renal disease        PAST SURGICAL HISTORY:   Past Surgical History:   Procedure Laterality Date     APPENDECTOMY       BRONCHOSCOPY FLEXIBLE N/A 10/12/2022    Procedure: Flexible bronchoscopy;  Surgeon: Omaira Mercedes MD;  Location: [facility] OR     COLONOSCOPY       CYSTOSCOPY       EYE SURGERY       LASER CO2 LARYNGOSCOPY N/A 10/12/2022    Procedure: MICROLARYNGOSCOPY, carbon dioxide laser resection of stenosis, CRE balloon dilation, steroid injection, biopsy;  Surgeon: Omaira Mercedes MD;  Location: [facility] OR     RENAL BIOPSY         CURRENT MEDICATIONS:   Current Outpatient Medications:      azaTHIOprine (IMURAN) 50 MG tablet, Take 50 mg by mouth , Disp: , Rfl:      azelastine (ASTELIN) 0.1 % nasal spray, Spray 1-2 sprays in nostril, Disp: , Rfl:      budesonide (PULMICORT) 0.5 MG/2ML neb solution, , Disp: , Rfl:      calcitRIOL (ROCALTROL) 0.25 MCG capsule, Take 0.25 mcg by mouth, Disp: , Rfl:      carvedilol (COREG) 6.25 MG tablet, TAKE 1  TABLET BY MOUTH TWICE DAILY WITH MEALS, Disp: , Rfl:      Cholecalciferol (VITAMIN D3) 50 MCG (2000 UT) CAPS, TAKE 1 CAPSULE (50 MCG) BY MOUTH 1 TIME PER DAY, Disp: , Rfl:      fluticasone-vilanterol (BREO ELLIPTA) 200-25 MCG/INH inhaler, INHALE 1 PUFF ORALLY 1 TIMEPER DAY RINSE MOUTH WITH WATER AFTER USE., Disp: , Rfl:      folic acid (FOLVITE) 1 MG tablet, , Disp: , Rfl:      gentamicin 80 mg in 1000 ml 0.9% NaCl (GARAMYCIN) SOLN nasal irrigation, Rinse each nasal cavity with 120 ml of mixture twice daily., Disp: 1000 mL, Rfl: 0     predniSONE (DELTASONE) 10 MG tablet, Take 4 tabs (40mg)  qd for 5 days, then take 2 tabs (20mg)  qd for 5 days, then take 1 tab (10mg) qd for 5 days. (Patient not taking: No sig reported), Disp: , Rfl:      predniSONE (DELTASONE) 5 MG tablet, Take 5 mg by mouth daily, Disp: , Rfl:     ALLERGIES: Other environmental allergy, Amlodipine, and Edda-kit bee sting    SOCIAL HISTORY:    Social History     Socioeconomic History     Marital status: Single     Spouse name: Not on file     Number of children: Not on file     Years of education: Not on file     Highest education level: Not on file   Occupational History     Not on file   Tobacco Use     Smoking status: Never     Smokeless tobacco: Never   Substance and Sexual Activity     Alcohol use: Not Currently     Drug use: Never     Sexual activity: Not on file   Other Topics Concern     Not on file   Social History Narrative     Not on file     Social Determinants of Health     Financial Resource Strain: Not on file   Food Insecurity: Not on file   Transportation Needs: Not on file   Physical Activity: Not on file   Stress: Not on file   Social Connections: Not on file   Intimate Partner Violence: Not on file   Housing Stability: Not on file         FAMILY HISTORY: No family history on file.   Non-contributory for problems with anesthesia    REVIEW OF SYSTEMS:   The patient was asked a 14 point review of systems regarding constitutional  symptoms, eye symptoms, ears, nose, mouth, throat symptoms, cardiovascular symptoms, respiratory symptoms, gastrointestinal symptoms, genitourinary symptoms, musculoskeletal symptoms, integumentary symptoms, neurological symptoms, psychiatric symptoms, endocrine symptoms, hematologic/lymphatic symptoms, and allergic/ immunologic symptoms.   The pertinent factors have been included in the HPI and below.  Patient Supplied Answers to Review of Systems  [facility] ENT ROS 11/12/2021   Neurology: Headache   Ears, Nose, Throat: Nasal congestion or drainage, Hoarseness   Cardiopulmonary: Cough       Physical Examination   The patient underwent a physical examination as described below. The pertinent positive and negative findings are summarized after the description of the examination.  Constitutional: The patient's developmental and nutritional status was assessed. The patient's voice quality was assessed.  Head and Face: The head and face were inspected for deformities. The sinuses were palpated. The salivary glands were palpated. Facial muscle strength was assessed bilaterally.  Eyes: Extraocular movements and primary gaze alignment were assessed.  Ears, Nose, Mouth and Throat: The ears and nose were examined for deformities. The nasal septum, mucosa, and turbinates were inspected by anterior rhinoscopy. The lips, teeth, and gums were examined for abnormalities. The oral mucosa, tongue, palate, tonsils, lateral and posterior pharynx were inspected for the presence of asymmetry or mucosal lesions.    Neck: The tracheal position was noted, and the neck mass palpated to determine if there were any asymmetries, abnormal neck masses, thyromegally, or thyroid nodules.  Respiratory: The nature of the breathing and chest expansion/symmetry was observed.  Cardiovascular: The patient was examined to determine the presence of any edema or jugular venous distension.  Abdomen: The contour of the abdomen was noted.  Lymphatic: The patient  was examined for infraclavicular lymphadenopathy.  Musculoskeletal: The patient was inspected for the presence of skeletal deformities.  Extremities: The extremities were examined for any clubbing or cyanosis.  Skin: The skin was examined for inflammatory or neoplastic conditions.  Neurologic: The patient's orientation, mood, and affect were noted. The cranial nerve  functions were examined.  Other pertinent positive and negative findings on physical examination:   OC/OP: no lesions, uvula midline, soft palate elevates symmetrically   Neck: no lesions, no TH tenderness to palpation    All other physical examination findings were within normal limits and noncontributory.    Procedures   Flexible laryngoscopy (CPT 16761)      Pre-procedure diagnosis: subglottic stenosis  Post-procedure diagnosis: same as above  Indication for procedure: Mr. Ricci is a 52 year old male with see above  Procedure(s): Fiberoptic Laryngoscopy    Details of Procedure: After informed consent was obtained, the patient was seated in the examination chair.  The areas of the nasopharynx as well as the hypopharynx were anesthetized with topical 4% lidocaine with 0.25% phenylephrine atomizer.  Examination of the base of tongue was performed first.  Attention was directed to any evidence of masses in the area or evidence of leukoplakia or candidal infection.  Attention was directed to the epiglottis where its size and position was determined and its movement on phonation of the vowel  e .  The piriform sinuses were then inspected for any mass lesions or pooling of secretions.  Attention was then directed to the larynx. The vocal folds were inspected for infection or any areas of leukoplakia, for masses, polypoid degeneration, or hemorrhage.  Having done this, the arytenoids and vocal processes were inspected for erythema or evidence of granuloma formation.  The posterior commissure was then inspected for evidence of inflammatory changes in the  mucosa and heaping up of mucosal tissue. The patient was then instructed to say the vowel  e .  Adduction of vocal folds to the midline was observed for any evidence of paresis or paralysis of the larynx or asymmetry in rotation of the larynx to the left or right. The patient was asked to breathe and the degree of abduction was noted bilaterally.  Subglottic view of the larynx was obtained for any additional mass lesions or mucosal changes.  Finally the post cricoid was examined for evidence of pooling of secretions, as well as the pharyngeal wall mucosa.   Anesthesia type: 0.25% phenylephrine    Findings:  Anatomic/physiological deviations: RNC, grade 1 stenosis, 5% narrowing   Right vocal process: No restriction of mobility   Left vocal process: No restriction of mobility  Glottal gap: Complete glottal closure  Supraglottic structures: Normal  Hypopharynx: Normal     Estimated Blood Loss: minimal  Complications: None  Disposition: Patient tolerated the procedure well            Review of Relevant Clinical Data   I personally reviewed:  Notes:    Radiology:    Pathology: 10/12/22  A. SUBGLOTTIC BIOPSY:  -Minute fragment of cauterized squamous epithelium, negative for malignancy.  -No submucosa present in this biopsy.  Procedures:    Labs:  No results found for: TSH  Lab Results   Component Value Date     10/12/2022    CO2 22 10/12/2022    BUN 31.8 (H) 10/12/2022     No results found for: WBC, HGB, HCT, MCV, PLT  No results found for: PT, PTT, INR  No results found for: LOU  No components found for: RHEUMATOIDFACTOR,  RF  No results found for: CRP  No components found for: CKTOT, URICACID  No components found for: C3, C4, DSDNAAB, NDNAABIFA  No results found for: MPOAB    Patient reported Quality of Life (QOL) Measures   Patient Supplied Answers To VHI Questionnaire  No flowsheet data found.      Patient Supplied Answers To EAT Questionnaire  No flowsheet data found.      Patient Supplied Answers To CSI  Questionnaire  No flowsheet data found.      Patient Supplied Answers to Dyspnea Index Questionnaire:  No flowsheet data found.    Impression & Plan     IMPRESSION: Mr. Ricci is a 52 year old male who is being seen for the followin. 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  Correlate clinically.   - denies reflux   - BMI is 25.53   - patient symptoms with labored breathing with exertion, nasal crusting  - peak flow meter is 250  - scope shows grade 3 75% subglottic narrowing, starts from below vocal folds  - discussed etiology of trauma (intubation), autoimmune, diabetes or idiopathic. In this case this is most likely autoimmune due to GPA, need to rule out DM  - discussed treatment with observation, conservative management with oral abx/steroids/reflux precautions, steroid injections, endoscopic procedures, open resection and bypass procedure with tracheotomy  - given severity of narrowing and symptomatology given difficulty with walking would recommend intervention with surgery to open up the narrowing before it gets tighter and he is not a candidate for an endoscopic approach  - also this has the appearance of scar rather than active GPA  - patient in agreement and elects to proceed with outpatient surgery   - discussed if narrowing recurs quickly after surgery then this is likely GPA induced and will need increase in immunosuppressive medications  - s/p MDL with CO2 laser, CRE balloon dilation to 15mmHg and steroid injection 10/12/22  - symptoms 11/10/2022 are much improved, peak flow is 460, able to walk without difficulty,  no longer feeling limited, did lose some muscle mass in his lefts  - scope shows grade 1, 5% narrowing  - discussed if this is GPA induced then if his disease is under control this should not return  - he is already on nebulizer with steroid from his pulm - would continue that  - discussed option of steroid injections but would like to defer at this time to see if a more conservative approach will work for him   - does not have HgA1c testing either - will hold off for now to see how his stenosis evolves  Plan  - monthly peak flow meter  - update me on mychart if it drops  - follow up in 3-4 months  - CC Dr. Camden Jett        2. Nasal crusting  - in setting of GPA  - has left sided crusting   Plan  - increase Neti pot to x2 daily    RETURN VISIT: 3-4 months    Omaira Mercedes MD    Laryngology    [facility]  Department of  Otolaryngology - Head and Neck Surgery  [facility]  [address] [place] [id number]  Appointment line: [phone]  Fax: [phone]  [email or web address]

## 2022-11-10 NOTE — LETTER
11/10/2022       RE: Duglas Ricci  [address]  [place] [address]     Dear Colleague,    Thank you for referring your patient, Duglas Ricci, to the [facility] E[facility] [place] [facility]. Please see a copy of my visit note below.        [facility]   at the [facility]   Otolaryngology Clinic     Patient: Duglas Ricci    MRN: [id number]    : 1970    Age/Gender: 52 year old male  Date of Service: 11/10/2022  Rendering Provider:   Omaira Mercedes MD     Chief Complaint   GPA  Subglottic stenosis  S/p MDL with CO2 laser, CRE balloon dilation to 15mmHg and steroid injection 10/12/22  Interval History   HISTORY OF PRESENT ILLNESS: Mr. Ricci is a 52 year old male is being followed for SGS. he was initially seen on 22. Please refer to this note for full history.     Today, he presents for follow up. he reports:  - doing well  - peak flow is 460  - able to walk without issues  - has some mucus at times after meals  - is using inhaler prescribed by pulm     PAST MEDICAL HISTORY:   Past Medical History:   Diagnosis Date     Calcified granuloma of lung (H)      CKD (chronic kidney disease)      Granulomatosis with polyangiitis (H)      Hypertension      Pauci-immune vasculitis (H)      Renal disease        PAST SURGICAL HISTORY:   Past Surgical History:   Procedure Laterality Date     APPENDECTOMY       BRONCHOSCOPY FLEXIBLE N/A 10/12/2022    Procedure: Flexible bronchoscopy;  Surgeon: Omaira Mercedes MD;  Location: [facility] OR     COLONOSCOPY       CYSTOSCOPY       EYE SURGERY       LASER CO2 LARYNGOSCOPY N/A 10/12/2022    Procedure: MICROLARYNGOSCOPY, carbon dioxide laser resection of stenosis, CRE balloon dilation, steroid injection, biopsy;  Surgeon: Omaira Mercedes MD;  Location: [facility] OR     RENAL BIOPSY         CURRENT MEDICATIONS:   Current Outpatient Medications:      azaTHIOprine (IMURAN) 50 MG tablet,  Take 50 mg by mouth , Disp: , Rfl:      azelastine (ASTELIN) 0.1 % nasal spray, Spray 1-2 sprays in nostril, Disp: , Rfl:      budesonide (PULMICORT) 0.5 MG/2ML neb solution, , Disp: , Rfl:      calcitRIOL (ROCALTROL) 0.25 MCG capsule, Take 0.25 mcg by mouth, Disp: , Rfl:      carvedilol (COREG) 6.25 MG tablet, TAKE 1 TABLET BY MOUTH TWICE DAILY WITH MEALS, Disp: , Rfl:      Cholecalciferol (VITAMIN D3) 50 MCG (2000 UT) CAPS, TAKE 1 CAPSULE (50 MCG) BY MOUTH 1 TIME PER DAY, Disp: , Rfl:      fluticasone-vilanterol (BREO ELLIPTA) 200-25 MCG/INH inhaler, INHALE 1 PUFF ORALLY 1 TIMEPER DAY RINSE MOUTH WITH WATER AFTER USE., Disp: , Rfl:      folic acid (FOLVITE) 1 MG tablet, , Disp: , Rfl:      gentamicin 80 mg in 1000 ml 0.9% NaCl (GARAMYCIN) SOLN nasal irrigation, Rinse each nasal cavity with 120 ml of mixture twice daily., Disp: 1000 mL, Rfl: 0     predniSONE (DELTASONE) 10 MG tablet, Take 4 tabs (40mg)  qd for 5 days, then take 2 tabs (20mg)  qd for 5 days, then take 1 tab (10mg) qd for 5 days. (Patient not taking: No sig reported), Disp: , Rfl:      predniSONE (DELTASONE) 5 MG tablet, Take 5 mg by mouth daily, Disp: , Rfl:     ALLERGIES: Other environmental allergy, Amlodipine, and Edda-kit bee sting    SOCIAL HISTORY:    Social History     Socioeconomic History     Marital status: Single     Spouse name: Not on file     Number of children: Not on file     Years of education: Not on file     Highest education level: Not on file   Occupational History     Not on file   Tobacco Use     Smoking status: Never     Smokeless tobacco: Never   Substance and Sexual Activity     Alcohol use: Not Currently     Drug use: Never     Sexual activity: Not on file   Other Topics Concern     Not on file   Social History Narrative     Not on file     Social Determinants of Health     Financial Resource Strain: Not on file   Food Insecurity: Not on file   Transportation Needs: Not on file   Physical Activity: Not on file   Stress: Not  on file   Social Connections: Not on file   Intimate Partner Violence: Not on file   Housing Stability: Not on file         FAMILY HISTORY: No family history on file.   Non-contributory for problems with anesthesia    REVIEW OF SYSTEMS:   The patient was asked a 14 point review of systems regarding constitutional symptoms, eye symptoms, ears, nose, mouth, throat symptoms, cardiovascular symptoms, respiratory symptoms, gastrointestinal symptoms, genitourinary symptoms, musculoskeletal symptoms, integumentary symptoms, neurological symptoms, psychiatric symptoms, endocrine symptoms, hematologic/lymphatic symptoms, and allergic/ immunologic symptoms.   The pertinent factors have been included in the HPI and below.  Patient Supplied Answers to Review of Systems  [facility] ROS 11/12/2021   Neurology: Headache   Ears, Nose, Throat: Nasal congestion or drainage, Hoarseness   Cardiopulmonary: Cough       Physical Examination   The patient underwent a physical examination as described below. The pertinent positive and negative findings are summarized after the description of the examination.  Constitutional: The patient's developmental and nutritional status was assessed. The patient's voice quality was assessed.  Head and Face: The head and face were inspected for deformities. The sinuses were palpated. The salivary glands were palpated. Facial muscle strength was assessed bilaterally.  Eyes: Extraocular movements and primary gaze alignment were assessed.  Ears, Nose, Mouth and Throat: The ears and nose were examined for deformities. The nasal septum, mucosa, and turbinates were inspected by anterior rhinoscopy. The lips, teeth, and gums were examined for abnormalities. The oral mucosa, tongue, palate, tonsils, lateral and posterior pharynx were inspected for the presence of asymmetry or mucosal lesions.    Neck: The tracheal position was noted, and the neck mass palpated to determine if there were any asymmetries, abnormal  neck masses, thyromegally, or thyroid nodules.  Respiratory: The nature of the breathing and chest expansion/symmetry was observed.  Cardiovascular: The patient was examined to determine the presence of any edema or jugular venous distension.  Abdomen: The contour of the abdomen was noted.  Lymphatic: The patient was examined for infraclavicular lymphadenopathy.  Musculoskeletal: The patient was inspected for the presence of skeletal deformities.  Extremities: The extremities were examined for any clubbing or cyanosis.  Skin: The skin was examined for inflammatory or neoplastic conditions.  Neurologic: The patient's orientation, mood, and affect were noted. The cranial nerve  functions were examined.  Other pertinent positive and negative findings on physical examination:   OC/OP: no lesions, uvula midline, soft palate elevates symmetrically   Neck: no lesions, no TH tenderness to palpation    All other physical examination findings were within normal limits and noncontributory.    Procedures   Flexible laryngoscopy (CPT 18306)      Pre-procedure diagnosis: subglottic stenosis  Post-procedure diagnosis: same as above  Indication for procedure: Mr. Ricci is a 52 year old male with see above  Procedure(s): Fiberoptic Laryngoscopy    Details of Procedure: After informed consent was obtained, the patient was seated in the examination chair.  The areas of the nasopharynx as well as the hypopharynx were anesthetized with topical 4% lidocaine with 0.25% phenylephrine atomizer.  Examination of the base of tongue was performed first.  Attention was directed to any evidence of masses in the area or evidence of leukoplakia or candidal infection.  Attention was directed to the epiglottis where its size and position was determined and its movement on phonation of the vowel  e .  The piriform sinuses were then inspected for any mass lesions or pooling of secretions.  Attention was then directed to the larynx. The vocal folds  were inspected for infection or any areas of leukoplakia, for masses, polypoid degeneration, or hemorrhage.  Having done this, the arytenoids and vocal processes were inspected for erythema or evidence of granuloma formation.  The posterior commissure was then inspected for evidence of inflammatory changes in the mucosa and heaping up of mucosal tissue. The patient was then instructed to say the vowel  e .  Adduction of vocal folds to the midline was observed for any evidence of paresis or paralysis of the larynx or asymmetry in rotation of the larynx to the left or right. The patient was asked to breathe and the degree of abduction was noted bilaterally.  Subglottic view of the larynx was obtained for any additional mass lesions or mucosal changes.  Finally the post cricoid was examined for evidence of pooling of secretions, as well as the pharyngeal wall mucosa.   Anesthesia type: 0.25% phenylephrine    Findings:  Anatomic/physiological deviations: RNC, grade 1 stenosis, 5% narrowing   Right vocal process: No restriction of mobility   Left vocal process: No restriction of mobility  Glottal gap: Complete glottal closure  Supraglottic structures: Normal  Hypopharynx: Normal     Estimated Blood Loss: minimal  Complications: None  Disposition: Patient tolerated the procedure well            Review of Relevant Clinical Data   I personally reviewed:  Notes:    Radiology:    Pathology: 10/12/22  A. SUBGLOTTIC BIOPSY:  -Minute fragment of cauterized squamous epithelium, negative for malignancy.  -No submucosa present in this biopsy.  Procedures:    Labs:  No results found for: TSH  Lab Results   Component Value Date     10/12/2022    CO2 22 10/12/2022    BUN 31.8 (H) 10/12/2022     No results found for: WBC, HGB, HCT, MCV, PLT  No results found for: PT, PTT, INR  No results found for: LOU  No components found for: RHEUMATOIDFACTOR,  RF  No results found for: CRP  No components found for: CKTOT, URICACID  No  components found for: C3, C4, DSDNAAB, NDNAABIFA  No results found for: MPOAB    Patient reported Quality of Life (QOL) Measures   Patient Supplied Answers To VHI Questionnaire  No flowsheet data found.      Patient Supplied Answers To EAT Questionnaire  No flowsheet data found.      Patient Supplied Answers To CSI Questionnaire  No flowsheet data found.      Patient Supplied Answers to Dyspnea Index Questionnaire:  No flowsheet data found.    Impression & Plan     IMPRESSION: Mr. Ricci is a 52 year old male who is being seen for the followin. 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  Correlate clinically.   - denies reflux   - BMI is 25.53   - patient symptoms with labored breathing with exertion, nasal crusting  - peak flow meter is 250  - scope shows grade 3 75% subglottic narrowing, starts from below vocal folds  - discussed etiology of trauma (intubation), autoimmune, diabetes or idiopathic. In this case this is most likely autoimmune due to GPA, need to rule out DM  - discussed treatment with observation, conservative management with oral abx/steroids/reflux precautions, steroid injections, endoscopic procedures, open resection and bypass procedure with tracheotomy  - given severity of narrowing and symptomatology given difficulty with walking would recommend intervention with surgery to open up the narrowing before it gets tighter and he is not a candidate for an endoscopic approach  - also this has the appearance of scar rather than active GPA  - patient in agreement and elects to proceed with outpatient surgery   -  discussed if narrowing recurs quickly after surgery then this is likely GPA induced and will need increase in immunosuppressive medications  - s/p MDL with CO2 laser, CRE balloon dilation to 15mmHg and steroid injection 10/12/22  - symptoms 11/10/2022 are much improved, peak flow is 460, able to walk without difficulty, no longer feeling limited, did lose some muscle mass in his lefts  - scope shows grade 1, 5% narrowing  - discussed if this is GPA induced then if his disease is under control this should not return  - he is already on nebulizer with steroid from his pulm - would continue that  - discussed option of steroid injections but would like to defer at this time to see if a more conservative approach will work for him   - does not have HgA1c testing either - will hold off for now to see how his stenosis evolves  Plan  - monthly peak flow meter  - update me on mychart if it drops  - follow up in 3-4 months  - CC Dr. Camden Jett        2. Nasal crusting  - in setting of GPA  - has left sided crusting   Plan  - increase Neti pot to x2 daily    RETURN VISIT: 3-4 months    Omaira Mercedes MD    Laryngology    [facility]  Department of  Otolaryngology - Head and Neck Surgery  Clinics & Surgery Center  [address] [place] [address]  Appointment line: [phone]  Fax: [phone]  [email or web address]

## 2023-10-21 ENCOUNTER — HEALTH MAINTENANCE LETTER (OUTPATIENT)
Age: 53
End: 2023-10-21

## 2024-12-14 ENCOUNTER — HEALTH MAINTENANCE LETTER (OUTPATIENT)
Age: 54
End: 2024-12-14

## (undated) DEVICE — JELLY LUBRICATING SURGILUBE 2OZ TUBE

## (undated) DEVICE — DRSG TELFA 3X8" 1238

## (undated) DEVICE — ENDO ADPT BRONCH SWIVEL Y A1002

## (undated) DEVICE — ANTIFOG SOLUTION W/FOAM PAD 31142527

## (undated) DEVICE — DRSG EYE PAD STERILE 1.63X2.63" NON21600

## (undated) DEVICE — KIT ENDO FIRST STEP DISINFECTANT 200ML W/POUCH EP-4

## (undated) DEVICE — ENDO VALVE BX EVIS MAJ-210

## (undated) DEVICE — ENDO VALVE SUCTION BRONCH EVIS MAJ-209

## (undated) DEVICE — SOL NACL 0.9% IRRIG 1000ML BOTTLE 2F7124

## (undated) DEVICE — DILATOR CRE PULM BALLOON 12-13.5-15MMX75CM 3CM WIRE 5035

## (undated) DEVICE — TUBING SMOKE EVAC 2.2CMX3M SEA3715

## (undated) DEVICE — NDL SCLEROTHERAPY 1.8MM 26GA 200CM M00518160

## (undated) DEVICE — NDL BUTTERFLY 25GA .75" 367298

## (undated) DEVICE — TUBING SMOKE EVAC .635CMX3M SEA3705

## (undated) DEVICE — TUBING SUCTION MEDI-VAC SOFT 3/16"X20' N520A

## (undated) DEVICE — SUCTION MANIFOLD NEPTUNE 2 SYS 4 PORT 0702-020-000

## (undated) DEVICE — SPONGE COTTONOID 1/2X3" 80-1407

## (undated) DEVICE — FIBER LSR 2MR 1.04MM .5MM ACUPULSE FIBERLASE 40W 295UM CO2

## (undated) DEVICE — LINEN TOWEL PACK X5 5464

## (undated) DEVICE — SYR INFLATOR AND GAUGE 60ML M00550601

## (undated) DEVICE — GLOVE PROTEXIS POWDER FREE SMT 6.5  2D72PT65X

## (undated) DEVICE — PACK ENT ENDOSCOPY UMMC

## (undated) DEVICE — ENDO TOOTH QUICK GUARD CONFORMING PROTECTION

## (undated) RX ORDER — TRIAMCINOLONE ACETONIDE 40 MG/ML
INJECTION, SUSPENSION INTRA-ARTICULAR; INTRAMUSCULAR
Status: DISPENSED
Start: 2022-10-12

## (undated) RX ORDER — ACETAMINOPHEN 325 MG/1
TABLET ORAL
Status: DISPENSED
Start: 2022-10-12

## (undated) RX ORDER — PROPOFOL 10 MG/ML
INJECTION, EMULSION INTRAVENOUS
Status: DISPENSED
Start: 2022-10-12

## (undated) RX ORDER — LIDOCAINE HYDROCHLORIDE 20 MG/ML
SOLUTION OROPHARYNGEAL
Status: DISPENSED
Start: 2022-11-10

## (undated) RX ORDER — FENTANYL CITRATE 50 UG/ML
INJECTION, SOLUTION INTRAMUSCULAR; INTRAVENOUS
Status: DISPENSED
Start: 2022-10-12

## (undated) RX ORDER — EPINEPHRINE 1 MG/ML
INJECTION, SOLUTION INTRAMUSCULAR; SUBCUTANEOUS
Status: DISPENSED
Start: 2022-10-12

## (undated) RX ORDER — LIDOCAINE HYDROCHLORIDE 20 MG/ML
SOLUTION OROPHARYNGEAL
Status: DISPENSED
Start: 2022-08-17